# Patient Record
Sex: FEMALE | Race: WHITE | NOT HISPANIC OR LATINO
[De-identification: names, ages, dates, MRNs, and addresses within clinical notes are randomized per-mention and may not be internally consistent; named-entity substitution may affect disease eponyms.]

---

## 2017-05-03 ENCOUNTER — APPOINTMENT (OUTPATIENT)
Dept: GYNECOLOGIC ONCOLOGY | Facility: CLINIC | Age: 72
End: 2017-05-03

## 2017-05-03 VITALS
HEIGHT: 62 IN | DIASTOLIC BLOOD PRESSURE: 70 MMHG | BODY MASS INDEX: 33.86 KG/M2 | SYSTOLIC BLOOD PRESSURE: 118 MMHG | WEIGHT: 184 LBS

## 2018-05-03 ENCOUNTER — APPOINTMENT (OUTPATIENT)
Dept: GYNECOLOGIC ONCOLOGY | Facility: CLINIC | Age: 73
End: 2018-05-03
Payer: MEDICARE

## 2018-05-03 VITALS
BODY MASS INDEX: 34.04 KG/M2 | DIASTOLIC BLOOD PRESSURE: 80 MMHG | HEIGHT: 62 IN | WEIGHT: 185 LBS | SYSTOLIC BLOOD PRESSURE: 120 MMHG

## 2018-05-03 PROCEDURE — 99213 OFFICE O/P EST LOW 20 MIN: CPT

## 2018-08-18 ENCOUNTER — TRANSCRIPTION ENCOUNTER (OUTPATIENT)
Age: 73
End: 2018-08-18

## 2018-08-18 ENCOUNTER — EMERGENCY (EMERGENCY)
Facility: HOSPITAL | Age: 73
LOS: 1 days | Discharge: HOME | End: 2018-08-18
Attending: EMERGENCY MEDICINE
Payer: MEDICARE

## 2018-08-18 VITALS
TEMPERATURE: 98 F | WEIGHT: 184.97 LBS | DIASTOLIC BLOOD PRESSURE: 84 MMHG | RESPIRATION RATE: 18 BRPM | HEIGHT: 62.5 IN | HEART RATE: 66 BPM | OXYGEN SATURATION: 95 % | SYSTOLIC BLOOD PRESSURE: 130 MMHG

## 2018-08-18 DIAGNOSIS — Z98.890 OTHER SPECIFIED POSTPROCEDURAL STATES: Chronic | ICD-10-CM

## 2018-08-18 DIAGNOSIS — Z90.710 ACQUIRED ABSENCE OF BOTH CERVIX AND UTERUS: Chronic | ICD-10-CM

## 2018-08-18 LAB
ALBUMIN SERPL ELPH-MCNC: 4.2 G/DL — SIGNIFICANT CHANGE UP (ref 3.3–5)
ALP SERPL-CCNC: 108 U/L — SIGNIFICANT CHANGE UP (ref 40–120)
ALT FLD-CCNC: 18 U/L — SIGNIFICANT CHANGE UP (ref 10–45)
ANION GAP SERPL CALC-SCNC: 12 MMOL/L — SIGNIFICANT CHANGE UP (ref 5–17)
APPEARANCE UR: CLEAR — SIGNIFICANT CHANGE UP
APTT BLD: 31.5 SEC — SIGNIFICANT CHANGE UP (ref 27.5–37.4)
AST SERPL-CCNC: 21 U/L — SIGNIFICANT CHANGE UP (ref 10–40)
BASOPHILS # BLD AUTO: 0.1 K/UL — SIGNIFICANT CHANGE UP (ref 0–0.2)
BASOPHILS NFR BLD AUTO: 0.9 % — SIGNIFICANT CHANGE UP (ref 0–2)
BILIRUB SERPL-MCNC: 0.4 MG/DL — SIGNIFICANT CHANGE UP (ref 0.2–1.2)
BILIRUB UR-MCNC: NEGATIVE — SIGNIFICANT CHANGE UP
BUN SERPL-MCNC: 12 MG/DL — SIGNIFICANT CHANGE UP (ref 7–23)
CALCIUM SERPL-MCNC: 10 MG/DL — SIGNIFICANT CHANGE UP (ref 8.4–10.5)
CHLORIDE SERPL-SCNC: 102 MMOL/L — SIGNIFICANT CHANGE UP (ref 96–108)
CO2 SERPL-SCNC: 27 MMOL/L — SIGNIFICANT CHANGE UP (ref 22–31)
COLOR SPEC: SIGNIFICANT CHANGE UP
CREAT SERPL-MCNC: 0.69 MG/DL — SIGNIFICANT CHANGE UP (ref 0.5–1.3)
DIFF PNL FLD: NEGATIVE — SIGNIFICANT CHANGE UP
EOSINOPHIL # BLD AUTO: 0.3 K/UL — SIGNIFICANT CHANGE UP (ref 0–0.5)
EOSINOPHIL NFR BLD AUTO: 4.4 % — SIGNIFICANT CHANGE UP (ref 0–6)
GLUCOSE SERPL-MCNC: 144 MG/DL — HIGH (ref 70–99)
GLUCOSE UR QL: NEGATIVE — SIGNIFICANT CHANGE UP
HCT VFR BLD CALC: 43.9 % — SIGNIFICANT CHANGE UP (ref 34.5–45)
HGB BLD-MCNC: 14.4 G/DL — SIGNIFICANT CHANGE UP (ref 11.5–15.5)
INR BLD: 0.93 RATIO — SIGNIFICANT CHANGE UP (ref 0.88–1.16)
KETONES UR-MCNC: NEGATIVE — SIGNIFICANT CHANGE UP
LEUKOCYTE ESTERASE UR-ACNC: ABNORMAL
LYMPHOCYTES # BLD AUTO: 1.7 K/UL — SIGNIFICANT CHANGE UP (ref 1–3.3)
LYMPHOCYTES # BLD AUTO: 26.4 % — SIGNIFICANT CHANGE UP (ref 13–44)
MCHC RBC-ENTMCNC: 31.9 PG — SIGNIFICANT CHANGE UP (ref 27–34)
MCHC RBC-ENTMCNC: 32.9 GM/DL — SIGNIFICANT CHANGE UP (ref 32–36)
MCV RBC AUTO: 97.2 FL — SIGNIFICANT CHANGE UP (ref 80–100)
MONOCYTES # BLD AUTO: 0.6 K/UL — SIGNIFICANT CHANGE UP (ref 0–0.9)
MONOCYTES NFR BLD AUTO: 8.7 % — SIGNIFICANT CHANGE UP (ref 2–14)
NEUTROPHILS # BLD AUTO: 3.8 K/UL — SIGNIFICANT CHANGE UP (ref 1.8–7.4)
NEUTROPHILS NFR BLD AUTO: 59.5 % — SIGNIFICANT CHANGE UP (ref 43–77)
NITRITE UR-MCNC: NEGATIVE — SIGNIFICANT CHANGE UP
PH UR: 7.5 — SIGNIFICANT CHANGE UP (ref 5–8)
PLATELET # BLD AUTO: 232 K/UL — SIGNIFICANT CHANGE UP (ref 150–400)
POTASSIUM SERPL-MCNC: 4 MMOL/L — SIGNIFICANT CHANGE UP (ref 3.5–5.3)
POTASSIUM SERPL-SCNC: 4 MMOL/L — SIGNIFICANT CHANGE UP (ref 3.5–5.3)
PROT SERPL-MCNC: 7.8 G/DL — SIGNIFICANT CHANGE UP (ref 6–8.3)
PROT UR-MCNC: NEGATIVE — SIGNIFICANT CHANGE UP
PROTHROM AB SERPL-ACNC: 10.1 SEC — SIGNIFICANT CHANGE UP (ref 9.8–12.7)
RBC # BLD: 4.52 M/UL — SIGNIFICANT CHANGE UP (ref 3.8–5.2)
RBC # FLD: 13.1 % — SIGNIFICANT CHANGE UP (ref 10.3–14.5)
SODIUM SERPL-SCNC: 141 MMOL/L — SIGNIFICANT CHANGE UP (ref 135–145)
SP GR SPEC: 1.01 — LOW (ref 1.01–1.02)
UROBILINOGEN FLD QL: NEGATIVE — SIGNIFICANT CHANGE UP
WBC # BLD: 6.4 K/UL — SIGNIFICANT CHANGE UP (ref 3.8–10.5)
WBC # FLD AUTO: 6.4 K/UL — SIGNIFICANT CHANGE UP (ref 3.8–10.5)

## 2018-08-18 PROCEDURE — 73610 X-RAY EXAM OF ANKLE: CPT

## 2018-08-18 PROCEDURE — 85610 PROTHROMBIN TIME: CPT

## 2018-08-18 PROCEDURE — 99284 EMERGENCY DEPT VISIT MOD MDM: CPT | Mod: GC

## 2018-08-18 PROCEDURE — 80053 COMPREHEN METABOLIC PANEL: CPT

## 2018-08-18 PROCEDURE — 99284 EMERGENCY DEPT VISIT MOD MDM: CPT

## 2018-08-18 PROCEDURE — 85027 COMPLETE CBC AUTOMATED: CPT

## 2018-08-18 PROCEDURE — 93971 EXTREMITY STUDY: CPT | Mod: 26

## 2018-08-18 PROCEDURE — 85730 THROMBOPLASTIN TIME PARTIAL: CPT

## 2018-08-18 PROCEDURE — 73610 X-RAY EXAM OF ANKLE: CPT | Mod: 26,RT

## 2018-08-18 PROCEDURE — 87086 URINE CULTURE/COLONY COUNT: CPT

## 2018-08-18 PROCEDURE — 81001 URINALYSIS AUTO W/SCOPE: CPT

## 2018-08-18 PROCEDURE — 93971 EXTREMITY STUDY: CPT

## 2018-08-18 NOTE — ED PROVIDER NOTE - PHYSICAL EXAMINATION
CONSTITUTIONAL: elderly female in no acute respiratory distress  HEAD: Normocephalic; atraumatic  EYES: PERRLA, EOMI, no nystagmus  ENMT: External appears normal; normal oropharynx  NECK: Supple; non-tender; no cervical lymphadenopathy  CARD: Normal Sl, S2; no audible murmurs, rubs, or gallops  RESP: Breathing comfortably on RA, normal wob, lungs ctab/l, no audible wheezes/rales/rhonchi  ABD: Soft, non-distended; non-tender; no rebound or guarding  EXT: Right lower extremity with swelling around the ankle, erythema in the area, tender to palpation, right lateral calf tender to palpation, no ecchymosis, old discoloring from previous cellulitis, distal pulses intact, no increased warmth   SKIN: Warm, dry, no rashes  NEURO: aaox3, equal strength and sensation b/l UE and LE, normal gait

## 2018-08-18 NOTE — ED ADULT NURSE NOTE - NSIMPLEMENTINTERV_GEN_ALL_ED
Implemented All Universal Safety Interventions:  Challenge to call system. Call bell, personal items and telephone within reach. Instruct patient to call for assistance. Room bathroom lighting operational. Non-slip footwear when patient is off stretcher. Physically safe environment: no spills, clutter or unnecessary equipment. Stretcher in lowest position, wheels locked, appropriate side rails in place.

## 2018-08-18 NOTE — ED PROVIDER NOTE - PLAN OF CARE
You were seen for swelling and redness in your ankle. Your ultrasound of the leg showed no clots and your xray showed no broken bones. Please return if you have increased pain in the leg, if the redness gets worse, if you develop fevers, or if you have chest pain or shortness of breath. Please take the antibiotic as prescribed.

## 2018-08-18 NOTE — ED PROVIDER NOTE - NS ED ROS FT
General: denies fever, chills  HENT: denies nasal congestion, sore throat, rhinorrhea, ear pain  Eyes: denies visual changes, blurred vision  CV: denies chest pain, palpitations  Resp: denies difficulty breathing, cough  Abdominal: denies nausea, vomiting, diarrhea, abdominal pain  MSK: + redness pain and swelling to right lower extremity   Neuro: denies headaches, numbness, tingling, dizziness, lightheadedness.  Skin: + itchy rash to upper back

## 2018-08-18 NOTE — ED PROVIDER NOTE - CARE PLAN
Assessment and plan of treatment:	You were seen for swelling and redness in your ankle. Your ultrasound of the leg showed no clots and your xray showed no broken bones. Please return if you have increased pain in the leg, if the redness gets worse, if you develop fevers, or if you have chest pain or shortness of breath. Please take the antibiotic as prescribed. Principal Discharge DX:	Cellulitis of right ankle  Assessment and plan of treatment:	You were seen for swelling and redness in your ankle. Your ultrasound of the leg showed no clots and your xray showed no broken bones. Please return if you have increased pain in the leg, if the redness gets worse, if you develop fevers, or if you have chest pain or shortness of breath. Please take the antibiotic as prescribed.

## 2018-08-18 NOTE — ED ADULT TRIAGE NOTE - BSA (M2)
----- Message from Cesia Pop MD sent at 3/6/2018  9:39 PM CST -----  Us looks good due date will be 8/27/17   1.86

## 2018-08-18 NOTE — ED PROVIDER NOTE - MEDICAL DECISION MAKING DETAILS
73F w/ history of cancer p/w right lower extremity swelling, swelling and erythema with tenderness to palpation in the right lateral calf, r/o DVT. r/o occult fracture w/ xray. Unlikely cellulitis given presentation

## 2018-08-18 NOTE — ED PROVIDER NOTE - OBJECTIVE STATEMENT
73F w/ history of uterine and breast cancer p/w right lower extremity swelling. States she had some swelling and redness in the right ankle and distal calf area for two days which became a lot worse today. Reports pain with palpation but no pain with ambulation or weight bearing. Reports an episode of cellulitis in the area. Denies fevers, chills, nausea, vomiting, diarrhea, SOB, chest pain, dizziness, lightheadedness. No personal history of clots but states her mother had a PE at 72. Denies trauma to the area, recent travel, prolonged immobilization.

## 2018-08-18 NOTE — ED PROVIDER NOTE - ATTENDING CONTRIBUTION TO CARE
74 yo female with atraumatic R ankle edema/erythema.  No clear infectious focus.  Will obtain plain films, r/o occult fx, U/S, r/o DVT, if normal then d/c with PO antibiotics and outpatient f/u.

## 2018-08-18 NOTE — ED ADULT NURSE NOTE - OBJECTIVE STATEMENT
1956 pt 73yf aox4 c/o rt ft swelling since thurs took only aspirin yesterday for pain states on movt 2/10, pending elisha/celia

## 2018-08-19 VITALS
SYSTOLIC BLOOD PRESSURE: 122 MMHG | HEART RATE: 67 BPM | OXYGEN SATURATION: 95 % | TEMPERATURE: 98 F | DIASTOLIC BLOOD PRESSURE: 76 MMHG | RESPIRATION RATE: 18 BRPM

## 2018-08-19 LAB
CULTURE RESULTS: NO GROWTH — SIGNIFICANT CHANGE UP
SPECIMEN SOURCE: SIGNIFICANT CHANGE UP

## 2018-08-19 RX ORDER — CEPHALEXIN 500 MG
1 CAPSULE ORAL
Qty: 20 | Refills: 0
Start: 2018-08-19 | End: 2018-08-23

## 2019-01-24 ENCOUNTER — APPOINTMENT (OUTPATIENT)
Dept: GYNECOLOGIC ONCOLOGY | Facility: CLINIC | Age: 74
End: 2019-01-24
Payer: MEDICARE

## 2019-01-24 VITALS
WEIGHT: 178 LBS | BODY MASS INDEX: 32.76 KG/M2 | HEIGHT: 62 IN | DIASTOLIC BLOOD PRESSURE: 82 MMHG | SYSTOLIC BLOOD PRESSURE: 128 MMHG

## 2019-01-24 PROBLEM — R32 UNSPECIFIED URINARY INCONTINENCE: Chronic | Status: ACTIVE | Noted: 2018-08-18

## 2019-01-24 PROBLEM — C50.919 MALIGNANT NEOPLASM OF UNSPECIFIED SITE OF UNSPECIFIED FEMALE BREAST: Chronic | Status: ACTIVE | Noted: 2018-08-18

## 2019-01-24 PROBLEM — C55 MALIGNANT NEOPLASM OF UTERUS, PART UNSPECIFIED: Chronic | Status: ACTIVE | Noted: 2018-08-18

## 2019-01-24 PROCEDURE — 57100 BIOPSY VAGINAL MUCOSA SIMPLE: CPT

## 2019-01-24 PROCEDURE — 99213 OFFICE O/P EST LOW 20 MIN: CPT | Mod: 25

## 2019-01-29 LAB — CORE LAB BIOPSY: NORMAL

## 2019-02-07 ENCOUNTER — TRANSCRIPTION ENCOUNTER (OUTPATIENT)
Age: 74
End: 2019-02-07

## 2019-07-25 ENCOUNTER — APPOINTMENT (OUTPATIENT)
Dept: GYNECOLOGIC ONCOLOGY | Facility: CLINIC | Age: 74
End: 2019-07-25
Payer: MEDICARE

## 2019-07-25 VITALS
HEIGHT: 62 IN | BODY MASS INDEX: 31.83 KG/M2 | SYSTOLIC BLOOD PRESSURE: 118 MMHG | HEART RATE: 71 BPM | WEIGHT: 173 LBS | DIASTOLIC BLOOD PRESSURE: 73 MMHG

## 2019-07-25 PROCEDURE — 99213 OFFICE O/P EST LOW 20 MIN: CPT

## 2019-07-25 NOTE — PROCEDURE
[Vaginal Biopsy] : a vaginal biopsy [Other: ___] : [unfilled] [Patient] : the patient [Verbal Consent] : verbal consent was obtained prior to the procedure and is detailed in the patient's record [Site Verification] : site verification performed. [Time Out] : Time Out Procedure:The following was confirmed prior to the procedure: Correct patient identity, correct site, agreement on the procedure to be done and correct patient position. [Joyce's] : Joyce's solution [Silver Nitrate] : silver nitrate [Yes] : the specimen was sent to pathology [No Complications] : none [Tolerated Well] : the patient tolerated the procedure well [Post procedure instructions and information given] : post procedure instructions and information given and reviewed with patient. [2] : 2 [FreeTextEntry1] : a few fragments of loosely attached tissue were biopsied ; then hemostatic agents applied; good hemostasis

## 2019-08-07 NOTE — LETTER BODY
[Dear  ___] : Dear  [unfilled], [I recently saw our patient [unfilled] for a follow-up visit.] : I recently saw our patient, [unfilled] for a follow-up visit. [Attached please find my note.] : Attached please find my note. [FreeTextEntry2] : She is clinically without evidence of disease at this time. \par I advised her to see me in 6 months for continued surveillance, and to see you annually in the interim. Thank you again for referring this sole patient.

## 2019-08-07 NOTE — PHYSICAL EXAM
[Absent] : Adnexa(ae): Absent [Normal] : Recto-Vaginal Exam: Normal [de-identified] : well-healed vertical hysterectomy scar [de-identified] : mild stenosis at apex moderate atrophy; disrupted adhesions at apex are actively bleeding with speculum insertion but no lesions (patient used dilator right before visit) [de-identified] : no mass or tenderness

## 2019-08-07 NOTE — HISTORY OF PRESENT ILLNESS
[FreeTextEntry1] : Ms. Neely has recurrent endometrial cancer. She underwent ELIDA/BSO in 2004 for FIGO grade 1 endometrioid adenocarcinoma, myoinvasive <50% (4/11mm) with negative cytology and no staging performed. She did not have any adjuvant therapy and did not follow up consistently with a gyn oncologist until February 2013 when she was diagnosed with a small vaginal apex recurrence by Dr. Cristian Alvarez. PET/CT revealed no evidence of metastatic disease, and she was therefore treated with radiation therapy.\par \par She was treated by Dr. Fry with EBRT 4500 cGy followed by brachytherapy completed 5/13/13. She tolerated treatment very well without significant toxicity. \par \par She is overall feeling very well and denies cancer-associated signs and symptoms. She is having regular BMs and denies urinary complaints other than incontinence in the mornings after taking her diuretic.  She saw a urologist and is on pharmacologic treatment. \par She is using her prescribed vaginal dilator therapy, although admits she has only been using it once a month, and actually didn’t use it for several months until a few days ago when she used it in advance of her appt today, and saw bleeding afterwards which stopped that day.  \par She otherwise denies VB or abnormal discharge, except for slight blood on the dilator after use.\par She had some chronic BRBPR evaluated by her GI, it is attributed to constipation/hemorrhoids and has improved with use of stool softeners.\par \par Incidentally, she also has a history of breast cancer which was diagnosed in 2008 and treated with left breast lumpectomy, radiation and chemotherapy. She was followed by [place]. Erickson Casanova at [facility], but currently has her breast exams with Dr. Gusman.\par She reports that her markers were recently elevated (we will call for report) and therefore Dr. Gusman ordered a PET/CT which was performed at [facility] this morning. \par \par PAPs: \par 1/2019 vaginal biopsy benign\par 10/13/16 PAP negative\par 3/10/16: PAP negative; vaginal biopsy- granulation tissue\par 10/2015: she reports that Dr. Fry did a PAP and it was normal (we have no report)\par 2/2015 negative\par 10/22/14 insufficient due to scant cellularity; \par 1/15/14 negative; vaginal biopsy 5/2014 benign\par \par Mammo/sono: 2018 negative per pt, breast health per Dr. Gusman; has appt for October\par Colonoscopy: 2014-15 negative per pt ; Dr. BRYSON Pires; diverticulosis\par \par GI: Dr. BRYSON Pires\par PCP: Dr. Cintron\par Med onc: Dr. Eleuterio Gusman\par Endocrinologist (thyroid nodule): Dr. Funmi Boles\par Urologist: Dr. Brown\par Referring GYN: Dr. Cristian Alvarez

## 2019-10-31 NOTE — ED ADULT NURSE NOTE - CAS TRG GENERAL AIRWAY, MLM
Patent O-L Flap Text: The defect edges were debeveled with a #15 scalpel blade.  Given the location of the defect, shape of the defect and the proximity to free margins an O-L flap was deemed most appropriate.  Using a sterile surgical marker, an appropriate advancement flap was drawn incorporating the defect and placing the expected incisions within the relaxed skin tension lines where possible.    The area thus outlined was incised deep to adipose tissue with a #15 scalpel blade.  The skin margins were undermined to an appropriate distance in all directions utilizing iris scissors.

## 2020-01-20 ENCOUNTER — TRANSCRIPTION ENCOUNTER (OUTPATIENT)
Age: 75
End: 2020-01-20

## 2020-02-06 ENCOUNTER — APPOINTMENT (OUTPATIENT)
Dept: GYNECOLOGIC ONCOLOGY | Facility: CLINIC | Age: 75
End: 2020-02-06
Payer: MEDICARE

## 2020-02-06 VITALS
HEART RATE: 77 BPM | BODY MASS INDEX: 30.36 KG/M2 | WEIGHT: 165 LBS | SYSTOLIC BLOOD PRESSURE: 144 MMHG | DIASTOLIC BLOOD PRESSURE: 84 MMHG | HEIGHT: 62 IN

## 2020-02-06 PROCEDURE — 99213 OFFICE O/P EST LOW 20 MIN: CPT

## 2020-08-13 ENCOUNTER — APPOINTMENT (OUTPATIENT)
Dept: GYNECOLOGIC ONCOLOGY | Facility: CLINIC | Age: 75
End: 2020-08-13
Payer: MEDICARE

## 2020-08-13 VITALS
SYSTOLIC BLOOD PRESSURE: 124 MMHG | WEIGHT: 164 LBS | HEIGHT: 62 IN | BODY MASS INDEX: 30.18 KG/M2 | HEART RATE: 89 BPM | DIASTOLIC BLOOD PRESSURE: 75 MMHG

## 2020-08-13 PROCEDURE — 99024 POSTOP FOLLOW-UP VISIT: CPT

## 2021-11-18 DIAGNOSIS — Z01.818 ENCOUNTER FOR OTHER PREPROCEDURAL EXAMINATION: ICD-10-CM

## 2021-11-29 ENCOUNTER — TRANSCRIPTION ENCOUNTER (OUTPATIENT)
Age: 76
End: 2021-11-29

## 2021-12-02 ENCOUNTER — APPOINTMENT (OUTPATIENT)
Dept: PULMONOLOGY | Facility: CLINIC | Age: 76
End: 2021-12-02
Payer: MEDICARE

## 2021-12-02 VITALS
RESPIRATION RATE: 16 BRPM | HEIGHT: 60 IN | HEART RATE: 60 BPM | SYSTOLIC BLOOD PRESSURE: 110 MMHG | BODY MASS INDEX: 31.02 KG/M2 | OXYGEN SATURATION: 87 % | TEMPERATURE: 97.3 F | WEIGHT: 158 LBS | DIASTOLIC BLOOD PRESSURE: 60 MMHG

## 2021-12-02 DIAGNOSIS — Z85.3 PERSONAL HISTORY OF MALIGNANT NEOPLASM OF BREAST: ICD-10-CM

## 2021-12-02 DIAGNOSIS — Z87.09 PERSONAL HISTORY OF OTHER DISEASES OF THE RESPIRATORY SYSTEM: ICD-10-CM

## 2021-12-02 PROCEDURE — 99204 OFFICE O/P NEW MOD 45 MIN: CPT | Mod: 25

## 2021-12-02 PROCEDURE — ZZZZZ: CPT

## 2021-12-02 PROCEDURE — 71046 X-RAY EXAM CHEST 2 VIEWS: CPT

## 2021-12-02 PROCEDURE — 94729 DIFFUSING CAPACITY: CPT

## 2021-12-02 PROCEDURE — 94618 PULMONARY STRESS TESTING: CPT

## 2021-12-02 PROCEDURE — 94727 GAS DIL/WSHOT DETER LNG VOL: CPT

## 2021-12-02 PROCEDURE — 94010 BREATHING CAPACITY TEST: CPT

## 2021-12-02 PROCEDURE — 95012 NITRIC OXIDE EXP GAS DETER: CPT

## 2021-12-02 RX ORDER — VERAPAMIL HYDROCHLORIDE 240 MG/1
240 TABLET ORAL
Refills: 0 | Status: ACTIVE | COMMUNITY

## 2021-12-02 RX ORDER — METOPROLOL TARTRATE 75 MG/1
TABLET, FILM COATED ORAL
Refills: 0 | Status: ACTIVE | COMMUNITY

## 2021-12-02 RX ORDER — RIVAROXABAN 15 MG/1
15 TABLET, FILM COATED ORAL
Refills: 0 | Status: ACTIVE | COMMUNITY

## 2021-12-02 NOTE — ASSESSMENT
[FreeTextEntry1] : Ms. YEUNG is a 76 year old female with a history of left sided breast cancer, former 40+ pack smoker, s/p lumpectomy and radiation theray (2008), uterine cancer, s/p ELIDA, diverticulitis, kyphoscoliosis, hidradenitis supertavia, thyroid nodules, 40 pack year smoker, bronchitis, vaginal cancer (2013) s/p radiation, s/p SVT, blood clots in both lungs (PE), arthritis, and balance issues  presenting to the office today for initial pulmonary evaluation. Her chief complaint is sob, restrictive dysfunction, COPD, pulmonary emboli, allergies, gerd \par \par Her shortness of breath is multifactorial due to:\par -poor mechanics of breathing \par -out of shape / overweight\par -pulmonary disease\par    -Full PFTs to follow\par -cardiac disease (Dr. Persaud ([facility]par \par Problem 1: restrictive dysfunction (kyphoscoliosis)\par -complete CBC \par -complete iron studies \par \par Problem 2: History of pulmonary emboli \par -Complete yearly ECHOcardiogram \par -continue Xarelto 15 mg \par Blood clots are noted w/in your lungs diagnosed by either abnormal CTPA or ventilation perfusion scan. You will need a hypercoagulable work up done by a hematologist to attempt to identify the etiology of this problem. Anticoagulation will be needed for at least 6 months- drugs included are Coumadin, lovenox, arixtra, or another agent. Repeat CTPA or VQ scan will be needed in approximately 6-8 weeks. An echocardiogram may be needed to assess for right ventricular function and pulmonary hypertension. The importance of hydration, ambulation, and regular/consistent diet are extremely important. \par \par \par Problem 3: COPD \par -add Anoro 1 inhalation QD \par COPD is a progressive disease and although it can’t be cured , appropriate management can slow its progression, reduce frequency and severity of exacerbations, and improve symptoms and the patient quality of life. Hospitalizations are the greatest contributor to the total COPD costs and account for up to 87% of total COPD related costs. Exacerbations are the main cause of admissions and subsequently account for the 40-75% of COPD costs. Inhaled maintenance therapy reduces the incidence of exacerbations in patients with stable COPD. Incorrect inhaler use and nonadherence are major obstacles to achieving COPD treatment goals. Many COPD patients have challenges (impaired inhalation, limited dexterity, reduced cognition: that limit their ability to correctly use their COPD treatment devices resulting in reduced symptom control. Of most importance is smoking cessation and early intervention with respiratory illnesses and contemplation for pulmonary rehab to enhance quality of life. \par Inhaler technique reviewed as well as oral hygiene techniques reviewed with patient. Avoidance of cold air, extremes of temperature, rescue inhaler should be used before exercise. Order of medication reviewed with patient. Recommended use of a cool mist humidifier in the bedroom. \par \par \par Problem 4: ILDz\par -Complete high resolution CT scan of the chest \par Etiology will depend on the causative agent possibilities include: idiopathic pulmonary fibrosis (UIP), NSIP (nonspecific interstitial pneumonia) , respiratory bronchiolitis in someone who is a smoker, drug induced lung disease, hypersensitivity pneumonitis, BOOP, sarcoidosis, chronic congestive heart failure,  rheumatologic disorder induced interstitial lung disease. Optimal diagnosing will include rheumatological panel which would include ESR, ASHELY, ANCA, ARNP, CCP, rheumatoid factor, hypersensitivity panel, JOE1, scleroderma antibodies, sjogren's syndrome antibodies; biopsy will be necessary to definitively determine the etiology unless the CT scan findings are specific for UIP. Therapy will be based on diagnostics. \par \par \par Problem 5:  Allergies/PND \par -add Olopatadine 0.6% 1 sniff BID \par Environmental measures for allergies were encouraged including mattress and pillow covers, air purifier, and environmental controls. \par \par Problem 6: GERD\par -Add Pepcid 40mg QHS \par -Rule of 2s: avoid eating too much, eating too late, eating too spicy, eating two hours before bed.\par -Things to avoid including overeating, spicy foods, tight clothing, eating within three hours of bed, this list is not all inclusive. \par -For treatment of reflux, possible options discussed including diet control, H2 blockers, PPIs, as well as coating motility agents discussed as treatment options. Timing of meals and proximity of last meal to sleep were discussed. If symptoms persist, a formal gastrointestinal evaluation is needed. \par \par Problem 7: Chronic Respiratory failure \par -Currently on Oxygen \par \par Problem 8: Balance issues \par -Complete balance therapy \par \par problem : cardiac disease\par -recommended to continue to follow up with Cardiologist \par \par problem : poor breathing mechanics\par -Proper breathing techniques were reviewed with an emphasis of exhalation. Patient instructed to breath in for 1 second and out for four seconds. Patient was encouraged to not talk while walking. \par \par problem : out of shape / overweight\par -Weight loss, exercise, and diet control were discussed and are highly encouraged. Treatment options were given such as, aqua therapy, and contacting a nutritionist. Recommended to use the elliptical, stationary bike, less use of treadmill. Mindful eating was explained to the patient Obesity is associated with worsening asthma, shortness of breath, and potential for cardiac disease, diabetes, and other underlying medical conditions\par \par problem : health maintenance \par -recommended yearly flu shot after October 15\par -recommended strep pneumonia vaccines: Prevnar-13 vaccine, followed by Pneumo vaccine 23 one year following after 65\par -recommended early intervention for Upper Respiratory Infections (URIs)\par -recommended regular osteoporosis evaluations\par -recommended early dermatological evaluations\par -recommended after the age of 50 to the age of 70, colonoscopy every 5 years\par \par F/U in 6-8 weeks.\par She is encouraged to call with any changes, concerns, or questions

## 2021-12-02 NOTE — PROCEDURE
[FreeTextEntry1] : Full PFT revealed moderate obstructive dysfunction,  normal flows, with a FEV1 of 1.03L,which is 57% of predicted, mild decreased  normal lung volumes, and a low normal diffusion of 13.3, which is 73% of predicted with a normal flow volume loop \par \par FENO was   22    ; a normal value being less than 25\par Fractional exhaled nitric oxide (FENO) is regarded as a simple, noninvasive method for assessing eosinophilic airway inflammation. Produced by a variety of cells within the lung, nitric oxide (NO) concentrations are generally low in healthy individuals. However, high concentrations of NO appear to be involved in nonspecific host defense mechanisms and chronic inflammatory diseases such as asthma. The American Thoracic Society (ATS) therefore has recommended using FENO to aid in the diagnosis and monitoring of eosinophilic airway inflammation and asthma, and for identifying steroid responsive individuals whose chronic respiratory symptoms may be caused by airway inflammation. \par \par CXR reveals a normal sized heart; evidence of increased interstitial markings in the lungs right over the left \par \par 6 minute walk test reveals a low saturation of 96% with slight evidence of dyspnea or fatigue; walked 260.8  meters \par \par CT scan (8/3/2021) showed a previously described bilateral pulmonary embolisms are no longer visualized. No acute cardiopulmonary abnormality. Lungs: lobar consolidation: negative, Pleural effusion: Negative. Pneumothorax: Negative. Other: Subsegmental atelectasis along with the lung bases \par

## 2021-12-02 NOTE — PHYSICAL EXAM
[No Acute Distress] : no acute distress [Normal Oropharynx] : normal oropharynx [III] : Mallampati Class: III [Normal Appearance] : normal appearance [No Neck Mass] : no neck mass [Normal Rate/Rhythm] : normal rate/rhythm [Normal S1, S2] : normal s1, s2 [No Murmurs] : no murmurs [No Resp Distress] : no resp distress [Clear to Auscultation Bilaterally] : clear to auscultation bilaterally [Kyphosis] : kyphosis [Benign] : benign [Normal Gait] : normal gait [No Clubbing] : no clubbing [No Cyanosis] : no cyanosis [No Edema] : no edema [FROM] : FROM [Normal Color/ Pigmentation] : normal color/ pigmentation [No Focal Deficits] : no focal deficits [Oriented x3] : oriented x3 [Normal Affect] : normal affect [TextBox_68] : I:E ratio 1:3; inspiratory crackles about 1/2 way up  [TextBox_105] : 1+ LE edema

## 2021-12-02 NOTE — HISTORY OF PRESENT ILLNESS
[TextBox_4] : Ms. YEUNG is a 76 year old female with a history of left sided breast cancer, s/p lumpectomy and radiation theray (2008), uterine cancer, s/p ELIDA, diverticulitis, kyphoscoliosis, hidradenitis supertavia, thyroid nodules, 40 pack year smoker, bronchitis, vaginal cancer (2013) s/p radiation, blood clots in both lungs (PE), history of SVT, arthritis  presenting to the office today for initial pulmonary evaluation. Her chief complaint is sob \par \par -she notes getting hospitalized july 2021 \par -she notes getting an angiogram done with ECHO\par -she notes having blood clots in the lung \par -she notes being given zeralto  15 mg \par -she notes going back 10 days later for CT scan and the clots were gone \par -she notes being on blood thinners \par -she notes breathing okay without oxygen for a couple hours and stays above 90% \par -she notes being on oxygen since 8/2021 \par -she notes being some sob in the past when she would walk \par -she notes arthritis \par -she denies orthopnea \par -she notes doing to [facility] 7/2021 \par -she notes her sinuses are okay \par -she notes doing to dr. morgan \par -pt states normal bowel movements \par -she notes being on metoprolol \par -she was getting palpitations but not too much recently \par -she notes having swelling in her legs \par -she notes having a lot of reflux but hasn't had it much recently \par -she notes her sleep patterns terrible because of nocturia \par -she denies snoring \par -she denies any headaches, nausea, vomiting, fever, chills, sweats, chest pain, chest pressure, diarrhea, constipation, dysphagia, dizziness, leg swelling, leg pain, itchy eyes, itchy ears, heartburn, reflux, sour taste in the mouth, myalgias or arthralgias.

## 2021-12-02 NOTE — ADDENDUM
[FreeTextEntry1] : Documented by Ayleen Leiva acting as a scribe for Dr. Lucas Prado on 12/02/2021 .\par \par All medical record entries made by the Scribe were at my, Dr. Lucas Prado's, direction and personally dictated by me on. I have reviewed the chart and agree that the record accurately reflects my personal performance of the history, physical exam, assessment and plan. I have also personally directed, reviewed, and agree with the discharge instructions.

## 2021-12-02 NOTE — REASON FOR VISIT
[Initial] : an initial visit [TextBox_44] : sob, restrictive dysfunction, COPD, pulmonary emboli, allergies, gerd

## 2021-12-08 ENCOUNTER — OUTPATIENT (OUTPATIENT)
Dept: OUTPATIENT SERVICES | Facility: HOSPITAL | Age: 76
LOS: 1 days | End: 2021-12-08
Payer: MEDICARE

## 2021-12-08 ENCOUNTER — APPOINTMENT (OUTPATIENT)
Dept: RADIOLOGY | Facility: IMAGING CENTER | Age: 76
End: 2021-12-08
Payer: MEDICARE

## 2021-12-08 ENCOUNTER — APPOINTMENT (OUTPATIENT)
Dept: CT IMAGING | Facility: IMAGING CENTER | Age: 76
End: 2021-12-08
Payer: MEDICARE

## 2021-12-08 DIAGNOSIS — Z98.890 OTHER SPECIFIED POSTPROCEDURAL STATES: Chronic | ICD-10-CM

## 2021-12-08 DIAGNOSIS — Z90.710 ACQUIRED ABSENCE OF BOTH CERVIX AND UTERUS: Chronic | ICD-10-CM

## 2021-12-08 DIAGNOSIS — J96.11 CHRONIC RESPIRATORY FAILURE WITH HYPOXIA: ICD-10-CM

## 2021-12-08 PROCEDURE — 71046 X-RAY EXAM CHEST 2 VIEWS: CPT | Mod: 26

## 2021-12-08 PROCEDURE — G1004: CPT

## 2021-12-08 PROCEDURE — 71250 CT THORAX DX C-: CPT | Mod: MG

## 2021-12-08 PROCEDURE — 71250 CT THORAX DX C-: CPT | Mod: 26,MG

## 2021-12-08 PROCEDURE — 71046 X-RAY EXAM CHEST 2 VIEWS: CPT

## 2021-12-27 ENCOUNTER — NON-APPOINTMENT (OUTPATIENT)
Age: 76
End: 2021-12-27

## 2022-01-28 ENCOUNTER — APPOINTMENT (OUTPATIENT)
Dept: PULMONOLOGY | Facility: CLINIC | Age: 77
End: 2022-01-28
Payer: MEDICARE

## 2022-01-28 ENCOUNTER — NON-APPOINTMENT (OUTPATIENT)
Age: 77
End: 2022-01-28

## 2022-01-28 VITALS
SYSTOLIC BLOOD PRESSURE: 116 MMHG | HEIGHT: 60 IN | OXYGEN SATURATION: 95 % | HEART RATE: 64 BPM | TEMPERATURE: 97.8 F | BODY MASS INDEX: 31.41 KG/M2 | RESPIRATION RATE: 16 BRPM | WEIGHT: 160 LBS | DIASTOLIC BLOOD PRESSURE: 68 MMHG

## 2022-01-28 DIAGNOSIS — Z01.811 ENCOUNTER FOR PREPROCEDURAL RESPIRATORY EXAMINATION: ICD-10-CM

## 2022-01-28 PROCEDURE — 95012 NITRIC OXIDE EXP GAS DETER: CPT

## 2022-01-28 PROCEDURE — 94010 BREATHING CAPACITY TEST: CPT

## 2022-01-28 PROCEDURE — 99214 OFFICE O/P EST MOD 30 MIN: CPT | Mod: 25

## 2022-01-28 NOTE — HISTORY OF PRESENT ILLNESS
[TextBox_4] : Ms. YEUNG is a 76 year old female with a history of left sided breast cancer, s/p lumpectomy and radiation theray (2008), uterine cancer, s/p ELIDA, diverticulitis, kyphoscoliosis, hidradenitis supertavia, thyroid nodules, 40 pack year smoker, bronchitis, vaginal cancer (2013) s/p radiation, blood clots in both lungs (PE), history of SVT, arthritis  presenting to the office today for a follow-up pulmonary evaluation. 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

## 2022-01-28 NOTE — ASSESSMENT
[FreeTextEntry1] : Ms. YEUNG is a 76 year old female with a history of left sided breast cancer, former 40+ pack smoker, s/p lumpectomy and radiation theray (2008), uterine cancer, s/p ELIDA, diverticulitis, kyphoscoliosis, hidradenitis supertavia, thyroid nodules, 40 pack year smoker, bronchitis, vaginal cancer (2013) s/p radiation, s/p SVT, blood clots in both lungs (PE), arthritis, and balance issues  presenting to the office today for a follow-up pulmonary evaluation. Her chief complaint is sob, restrictive dysfunction, COPD, pulmonary emboli, allergies, gerd - mild ILDz\par \par ******************************************************Pre-op Clearance ****************************************************\par \par \par Her shortness of breath is multifactorial due to:\par -poor mechanics of breathing \par -out of shape / overweight\par -pulmonary disease\par    -Full PFTs to follow\par -cardiac disease (Dr. Persaud ([facility]))\par \par Problem 1: restrictive dysfunction (kyphoscoliosis)\par -complete CBC \par -complete iron studies \par \par Problem 2: History of pulmonary emboli \par -Complete yearly ECHOcardiogram ([place])\par -continue Xarelto 15 mg \par Blood clots are noted w/in your lungs diagnosed by either abnormal CTPA or ventilation perfusion scan. You will need a hypercoagulable work up done by a hematologist to attempt to identify the etiology of this problem. Anticoagulation will be needed for at least 6 months- drugs included are Coumadin, lovenox, arixtra, or another agent. Repeat CTPA or VQ scan will be needed in approximately 6-8 weeks. An echocardiogram may be needed to assess for right ventricular function and pulmonary hypertension. The importance of hydration, ambulation, and regular/consistent diet are extremely important. \par \par \par Problem 3: COPD \par -continue Anoro 1 inhalation QD \par COPD is a progressive disease and although it can’t be cured , appropriate management can slow its progression, reduce frequency and severity of exacerbations, and improve symptoms and the patient quality of life. Hospitalizations are the greatest contributor to the total COPD costs and account for up to 87% of total COPD related costs. Exacerbations are the main cause of admissions and subsequently account for the 40-75% of COPD costs. Inhaled maintenance therapy reduces the incidence of exacerbations in patients with stable COPD. Incorrect inhaler use and nonadherence are major obstacles to achieving COPD treatment goals. Many COPD patients have challenges (impaired inhalation, limited dexterity, reduced cognition: that limit their ability to correctly use their COPD treatment devices resulting in reduced symptom control. Of most importance is smoking cessation and early intervention with respiratory illnesses and contemplation for pulmonary rehab to enhance quality of life. \par Inhaler technique reviewed as well as oral hygiene techniques reviewed with patient. Avoidance of cold air, extremes of temperature, rescue inhaler should be used before exercise. Order of medication reviewed with patient. Recommended use of a cool mist humidifier in the bedroom. \par \par Problem 3A: Pulmonary Pre-Op Clearance for Dental Surgery\par -at this point in time there are no absolute pulmonary contraindications to go forward with the planned procedure \par -at the time of surgery s/he should have optimal pain control, incentive spirometry, early ambulation, DVT and GI prophylaxis. \par \par \par Problem 4: ILDz\par -s/p high resolution CT scan of the chest c/w mild ILDz, follow-up CT 12/2022\par -complete rheumatologic blood work\par -Hold Rx\par Etiology will depend on the causative agent possibilities include: idiopathic pulmonary fibrosis (UIP), NSIP (nonspecific interstitial pneumonia) , respiratory bronchiolitis in someone who is a smoker, drug induced lung disease, hypersensitivity pneumonitis, BOOP, sarcoidosis, chronic congestive heart failure,  rheumatologic disorder induced interstitial lung disease. Optimal diagnosing will include rheumatological panel which would include ESR, ASHELY, ANCA, ARNP, CCP, rheumatoid factor, hypersensitivity panel, JOE1, scleroderma antibodies, sjogren's syndrome antibodies; biopsy will be necessary to definitively determine the etiology unless the CT scan findings are specific for UIP. Therapy will be based on diagnostics. \par \par \par Problem 5:  Allergies/PND \par -continue Olopatadine 0.6% 1 sniff BID \par Environmental measures for allergies were encouraged including mattress and pillow covers, air purifier, and environmental controls. \par \par Problem 6: GERD\par -continue Pepcid 40mg QHS \par -Rule of 2s: avoid eating too much, eating too late, eating too spicy, eating two hours before bed.\par -Things to avoid including overeating, spicy foods, tight clothing, eating within three hours of bed, this list is not all inclusive. \par -For treatment of reflux, possible options discussed including diet control, H2 blockers, PPIs, as well as coating motility agents discussed as treatment options. Timing of meals and proximity of last meal to sleep were discussed. If symptoms persist, a formal gastrointestinal evaluation is needed. \par \par Problem 7: Chronic Respiratory failure \par -Currently on Oxygen 2L NC\par \par Problem 8: Balance issues \par -Complete balance therapy \par \par problem 9: cardiac disease\par -recommended to continue to follow up with Cardiologist (Dr. Persaud)\par \par problem 10: poor breathing mechanics\par -Recommended Wim Hof and Buteyko breathing techniques \par -Proper breathing techniques were reviewed with an emphasis of exhalation. Patient instructed to breath in for 1 second and out for four seconds. Patient was encouraged to not talk while walking. \par \par problem 11: out of shape / overweight\par -Weight loss, exercise, and diet control were discussed and are highly encouraged. Treatment options were given such as, aqua therapy, and contacting a nutritionist. Recommended to use the elliptical, stationary bike, less use of treadmill. Mindful eating was explained to the patient Obesity is associated with worsening asthma, shortness of breath, and potential for cardiac disease, diabetes, and other underlying medical conditions\par \par problem : health maintenance \par -Antivax COVID-19\par -Recommend [employer] Knee Sleeve\par -recommended yearly flu shot after October 15\par -recommended strep pneumonia vaccines: Prevnar-13 vaccine, followed by Pneumo vaccine 23 one year following after 65\par -recommended early intervention for Upper Respiratory Infections (URIs)\par -recommended regular osteoporosis evaluations\par -recommended early dermatological evaluations\par -recommended after the age of 50 to the age of 70, colonoscopy every 5 years\par \par F/U in 6-8 weeks.\par She is encouraged to call with any changes, concerns, or questions

## 2022-01-28 NOTE — REASON FOR VISIT
[Follow-Up] : a follow-up visit [TextBox_44] : sob, restrictive dysfunction, COPD, pulmonary emboli, allergies, gerd

## 2022-01-28 NOTE — ADDENDUM
[FreeTextEntry1] : Documented by Leonard Johnson acting as a scribe for Dr. Lucas Prado on 01/28/2022\par \par All medical record entries made by the scribe were at my, Dr. Lucas Prado's, direction and personally dictated by me on 01/28/2022. I have reviewed the chart and agree that the record accurately reflects my personal performance of the history, physical exam, assessment, and plan. I have also personally directed, reviewed, and agree with the discharge instructions.

## 2022-01-28 NOTE — PHYSICAL EXAM
[No Acute Distress] : no acute distress [Normal Oropharynx] : normal oropharynx [III] : Mallampati Class: III [Normal Appearance] : normal appearance [No Neck Mass] : no neck mass [Normal Rate/Rhythm] : normal rate/rhythm [Normal S1, S2] : normal s1, s2 [No Murmurs] : no murmurs [No Resp Distress] : no resp distress [Clear to Auscultation Bilaterally] : clear to auscultation bilaterally [Kyphosis] : kyphosis [Benign] : benign [Normal Gait] : normal gait [No Clubbing] : no clubbing [No Cyanosis] : no cyanosis [No Edema] : no edema [FROM] : FROM [Normal Color/ Pigmentation] : normal color/ pigmentation [No Focal Deficits] : no focal deficits [Oriented x3] : oriented x3 [Normal Affect] : normal affect [TextBox_2] : on oxygen [TextBox_68] : I:E 1:3, distant breath sounds [TextBox_105] : 1+ LE edema

## 2022-01-28 NOTE — PROCEDURE
[FreeTextEntry1] : FENO was 21; a normal value being less than 25. Fractional exhaled nitric oxide (FENO) is regarded as a simple, noninvasive method for assessing eosinophilic airway inflammation. Produced by a variety of cells within the lung, nitric oxide (NO) concentrations are generally low in healthy individuals. However, high concentrations of NO appear to be involved in nonspecific host defense mechanisms and chronic inflammatory  diseases such as asthma. The American Thoracic Society (ATS) therefore recommended using FENO to aid in the diagnosis and monitoring of eosinophilic airway inflammation and asthma, and for identifying steroid responsive individuals whose chronic respiratory symptoms may be caused by airway inflammation \par \par CT chest (12.8.2021) revealed Mild peripheral reticulation and bronchiectasis. No honeycombing. Right upper lobe subpleural 2 mm nodule. Suggestion of pulmonary artery hypertension. One year follow-up CT recommended.\par 2 cm right thyroid lobe nodule. Ultrasound recommended for complete evaluation.\par \par \par PFT revealed mild-moderate restrictive dysfunction, with a FEV1 of 1.02L, which is 62% of predicted, with a normal flow volume loop

## 2022-03-03 ENCOUNTER — APPOINTMENT (OUTPATIENT)
Dept: GYNECOLOGIC ONCOLOGY | Facility: CLINIC | Age: 77
End: 2022-03-03
Payer: MEDICARE

## 2022-03-03 PROCEDURE — 99213 OFFICE O/P EST LOW 20 MIN: CPT

## 2022-03-03 RX ORDER — COLD-HOT PACK
EACH MISCELLANEOUS
Refills: 0 | Status: ACTIVE | COMMUNITY

## 2022-03-03 RX ORDER — PNV NO.95/FERROUS FUM/FOLIC AC 28MG-0.8MG
TABLET ORAL
Refills: 0 | Status: ACTIVE | COMMUNITY

## 2022-03-03 RX ORDER — MIRABEGRON 50 MG/1
50 TABLET, FILM COATED, EXTENDED RELEASE ORAL
Refills: 0 | Status: ACTIVE | COMMUNITY

## 2022-04-07 RX ORDER — UMECLIDINIUM BROMIDE AND VILANTEROL TRIFENATATE 62.5; 25 UG/1; UG/1
62.5-25 POWDER RESPIRATORY (INHALATION) DAILY
Qty: 3 | Refills: 1 | Status: ACTIVE | COMMUNITY
Start: 2021-12-02 | End: 1900-01-01

## 2022-05-31 ENCOUNTER — APPOINTMENT (OUTPATIENT)
Dept: PULMONOLOGY | Facility: CLINIC | Age: 77
End: 2022-05-31
Payer: MEDICARE

## 2022-05-31 VITALS
HEIGHT: 60 IN | HEART RATE: 70 BPM | OXYGEN SATURATION: 86 % | RESPIRATION RATE: 16 BRPM | TEMPERATURE: 98 F | WEIGHT: 164 LBS | BODY MASS INDEX: 32.2 KG/M2 | SYSTOLIC BLOOD PRESSURE: 140 MMHG | DIASTOLIC BLOOD PRESSURE: 80 MMHG

## 2022-05-31 PROCEDURE — 99214 OFFICE O/P EST MOD 30 MIN: CPT | Mod: 25

## 2022-05-31 PROCEDURE — 95012 NITRIC OXIDE EXP GAS DETER: CPT

## 2022-05-31 PROCEDURE — 94010 BREATHING CAPACITY TEST: CPT

## 2022-05-31 PROCEDURE — ZZZZZ: CPT

## 2022-05-31 PROCEDURE — 94727 GAS DIL/WSHOT DETER LNG VOL: CPT

## 2022-05-31 PROCEDURE — 94729 DIFFUSING CAPACITY: CPT

## 2022-05-31 RX ORDER — AMOXICILLIN 500 MG/1
500 CAPSULE ORAL
Qty: 21 | Refills: 0 | Status: DISCONTINUED | COMMUNITY
Start: 2022-04-23

## 2022-05-31 RX ORDER — METOPROLOL SUCCINATE 25 MG/1
25 TABLET, EXTENDED RELEASE ORAL
Qty: 90 | Refills: 0 | Status: ACTIVE | COMMUNITY
Start: 2022-05-20

## 2022-05-31 NOTE — ADDENDUM
[FreeTextEntry1] : Documented by Sandra Hernandez acting as a scribe for Dr. Lucas Prado on 05/31/2022 \par \par All medical record entries made by the Scribe were at my, Dr. Lucas Prado's, direction and personally dictated by me on 05/31/2022 . I have reviewed the chart and agree that the record accurately reflects my personal performance of the history, physical exam, assessment and plan. I have also personally directed, reviewed, and agree with the discharge instructions

## 2022-05-31 NOTE — PROCEDURE
[FreeTextEntry1] : FENO was 21 ; a normal value being less than 25\par Fractional exhaled nitric oxide (FENO) is regarded as a simple, noninvasive method for assessing eosinophilic airway inflammation. Produced by a variety of cells within the lung, nitric oxide (NO) concentrations are generally low in healthy individuals. However, high concentrations of NO appear to be involved in nonspecific host defense mechanisms and chronic inflammatory diseases such as asthma. The American Thoracic Society (ATS) therefore has recommended using FENO to aid in the diagnosis and monitoring of eosinophilic airway inflammation and asthma, and for identifying steroid responsive individuals whose chronic respiratory symptoms may be caused by airway inflammation. \par \par Full PFT revealed mild restrictive dysfunction, with a FEV1 of 1.10L,which is 62% of predicted,  moderately reduced lung volumes, and a moderately reduced diffusion of 8.5, which is 47% of predicted with a normal flow volume loop\par \par \par

## 2022-05-31 NOTE — HISTORY OF PRESENT ILLNESS
[TextBox_4] : Ms. YEUNG is a 76 year old female with a history of left sided breast cancer, s/p lumpectomy and radiation theray (2008), uterine cancer, s/p ELIDA, diverticulitis, kyphoscoliosis, hidradenitis supertavia, thyroid nodules, 40 pack year smoker, bronchitis, vaginal cancer (2013) s/p radiation, blood clots in both lungs (PE), history of SVT, arthritis  presenting to the office today for a follow-up pulmonary evaluation. Her chief complaint is\par \par -she notes persistent knee issues \par -she notes intermittent night sweats \par -she notes intermittent constipation\par -she notes right eye vision has decreased due to devolving cataracts \par -she notes good quality of sleep \par -she notes getting enough sleep of about 9 hrs \par -she notes walking is limited due to arthritic knee\par -she notes walking in house for exercise \par -she notes weight is mostly stable \par -she denies coughing \par -she denies wheezing \par -she notes LEONG \par -she notes s/p dental procedure \par \par -denies any headaches, nausea, vomiting, fever, chills, chest pain, chest pressure, diarrhea, dysphagia, dizziness, leg swelling, itchy eyes, itchy ears, heartburn, reflux, or sour taste in the mouth.

## 2022-05-31 NOTE — PHYSICAL EXAM
[No Acute Distress] : no acute distress [Normal Oropharynx] : normal oropharynx [III] : Mallampati Class: III [Normal Appearance] : normal appearance [No Neck Mass] : no neck mass [Normal Rate/Rhythm] : normal rate/rhythm [Normal S1, S2] : normal s1, s2 [No Murmurs] : no murmurs [No Resp Distress] : no resp distress [Clear to Auscultation Bilaterally] : clear to auscultation bilaterally [Kyphosis] : kyphosis [Benign] : benign [Normal Gait] : normal gait [No Clubbing] : no clubbing [No Cyanosis] : no cyanosis [No Edema] : no edema [FROM] : FROM [Normal Color/ Pigmentation] : normal color/ pigmentation [No Focal Deficits] : no focal deficits [Oriented x3] : oriented x3 [Normal Affect] : normal affect [TextBox_2] : on oxygen [TextBox_68] : I:E ratio 1:3; clear

## 2022-05-31 NOTE — ASSESSMENT
[FreeTextEntry1] : Ms. YEUNG is a 76 year old female with a history of left sided breast cancer, former 40+ pack smoker, s/p lumpectomy and radiation theray (2008), uterine cancer, s/p ELIDA, diverticulitis, kyphoscoliosis, hidradenitis supertavia, thyroid nodules, 40 pack year smoker, bronchitis, vaginal cancer (2013) s/p radiation, s/p SVT, blood clots in both lungs (PE), arthritis, and balance issues  presenting to the office today for a follow-up pulmonary evaluation. Her chief complaint is sob, restrictive dysfunction, COPD, pulmonary emboli, allergies, gerd - mild ILDz- stable \par \par \par Her shortness of breath is multifactorial due to:\par -poor mechanics of breathing \par -out of shape / overweight\par -pulmonary disease\par    -Full PFTs to follow\par -cardiac disease (Dr. Persaud ([facility]))\par \par Problem 1: restrictive dysfunction (kyphoscoliosis)\par -complete CBC \par -complete iron studies \par \par Problem 2: History of pulmonary emboli \par -Complete yearly ECHOcardiogram ([place])\par -continue Xarelto 15 mg \par Blood clots are noted w/in your lungs diagnosed by either abnormal CTPA or ventilation perfusion scan. You will need a hypercoagulable work up done by a hematologist to attempt to identify the etiology of this problem. Anticoagulation will be needed for at least 6 months- drugs included are Coumadin, lovenox, arixtra, or another agent. Repeat CTPA or VQ scan will be needed in approximately 6-8 weeks. An echocardiogram may be needed to assess for right ventricular function and pulmonary hypertension. The importance of hydration, ambulation, and regular/consistent diet are extremely important. \par \par \par Problem 3: COPD \par -continue Anoro 1 inhalation QD \par COPD is a progressive disease and although it can’t be cured , appropriate management can slow its progression, reduce frequency and severity of exacerbations, and improve symptoms and the patient quality of life. Hospitalizations are the greatest contributor to the total COPD costs and account for up to 87% of total COPD related costs. Exacerbations are the main cause of admissions and subsequently account for the 40-75% of COPD costs. Inhaled maintenance therapy reduces the incidence of exacerbations in patients with stable COPD. Incorrect inhaler use and nonadherence are major obstacles to achieving COPD treatment goals. Many COPD patients have challenges (impaired inhalation, limited dexterity, reduced cognition: that limit their ability to correctly use their COPD treatment devices resulting in reduced symptom control. Of most importance is smoking cessation and early intervention with respiratory illnesses and contemplation for pulmonary rehab to enhance quality of life. \par Inhaler technique reviewed as well as oral hygiene techniques reviewed with patient. Avoidance of cold air, extremes of temperature, rescue inhaler should be used before exercise. Order of medication reviewed with patient. Recommended use of a cool mist humidifier in the bedroom. \par \par \par Problem 4: ILDz\par -s/p high resolution CT scan of the chest c/w mild ILDz, follow-up CT 12/2022\par -complete rheumatologic blood work- [place]\par -Hold Rx\par Etiology will depend on the causative agent possibilities include: idiopathic pulmonary fibrosis (UIP), NSIP (nonspecific interstitial pneumonia) , respiratory bronchiolitis in someone who is a smoker, drug induced lung disease, hypersensitivity pneumonitis, BOOP, sarcoidosis, chronic congestive heart failure,  rheumatologic disorder induced interstitial lung disease. Optimal diagnosing will include rheumatological panel which would include ESR, ASHELY, ANCA, ARNP, CCP, rheumatoid factor, hypersensitivity panel, JOE1, scleroderma antibodies, sjogren's syndrome antibodies; biopsy will be necessary to definitively determine the etiology unless the CT scan findings are specific for UIP. Therapy will be based on diagnostics. \par \par \par Problem 5:  Allergies/PND \par -continue Olopatadine 0.6% 1 sniff BID \par Environmental measures for allergies were encouraged including mattress and pillow covers, air purifier, and environmental controls. \par \par Problem 6: GERD\par -continue Pepcid 40mg QHS \par -Rule of 2s: avoid eating too much, eating too late, eating too spicy, eating two hours before bed.\par -Things to avoid including overeating, spicy foods, tight clothing, eating within three hours of bed, this list is not all inclusive. \par -For treatment of reflux, possible options discussed including diet control, H2 blockers, PPIs, as well as coating motility agents discussed as treatment options. Timing of meals and proximity of last meal to sleep were discussed. If symptoms persist, a formal gastrointestinal evaluation is needed. \par \par Problem 7: Chronic Respiratory failure \par -Currently on Oxygen 2L NC\par -Patient is in a stable state\par -Tests results (overnight oximetry, 6 minute walk test, exercise study, arterial blood gas, etc.) reveal that oxygen is necessary for daily life- optimally it should be used with any activity and during sleep. \par \par Problem 8: Balance issues \par -Complete balance therapy \par \par problem 9: cardiac disease\par -recommended to continue to follow up with Cardiologist (Dr. Persaud)\par \par problem 10: poor breathing mechanics\par -Recommended Wim Hof and Buteyko breathing techniques \par -Proper breathing techniques were reviewed with an emphasis of exhalation. Patient instructed to breath in for 1 second and out for four seconds. Patient was encouraged to not talk while walking. \par \par problem 11: out of shape / overweight\par -Weight loss, exercise, and diet control were discussed and are highly encouraged. Treatment options were given such as, aqua therapy, and contacting a nutritionist. Recommended to use the elliptical, stationary bike, less use of treadmill. Mindful eating was explained to the patient Obesity is associated with worsening asthma, shortness of breath, and potential for cardiac disease, diabetes, and other underlying medical conditions\par \par problem : health maintenance \par -Antivax COVID-19\par -Recommend [employer] Knee Sleeve\par -recommended yearly flu shot after October 15\par -recommended strep pneumonia vaccines: Prevnar-13 vaccine, followed by Pneumo vaccine 23 one year following after 65\par -recommended early intervention for Upper Respiratory Infections (URIs)\par -recommended regular osteoporosis evaluations\par -recommended early dermatological evaluations\par -recommended after the age of 50 to the age of 70, colonoscopy every 5 years\par \par F/U in 6-8 weeks.\par She is encouraged to call with any changes, concerns, or questions

## 2022-06-09 ENCOUNTER — RX RENEWAL (OUTPATIENT)
Age: 77
End: 2022-06-09

## 2022-08-12 ENCOUNTER — APPOINTMENT (OUTPATIENT)
Dept: PULMONOLOGY | Facility: CLINIC | Age: 77
End: 2022-08-12

## 2022-08-12 VITALS
TEMPERATURE: 97.5 F | HEIGHT: 61 IN | SYSTOLIC BLOOD PRESSURE: 130 MMHG | RESPIRATION RATE: 16 BRPM | OXYGEN SATURATION: 90 % | BODY MASS INDEX: 31.15 KG/M2 | DIASTOLIC BLOOD PRESSURE: 70 MMHG | HEART RATE: 74 BPM | WEIGHT: 165 LBS

## 2022-08-12 PROCEDURE — ZZZZZ: CPT

## 2022-08-12 PROCEDURE — 99214 OFFICE O/P EST MOD 30 MIN: CPT | Mod: 25

## 2022-08-12 PROCEDURE — 94010 BREATHING CAPACITY TEST: CPT

## 2022-08-12 PROCEDURE — 95012 NITRIC OXIDE EXP GAS DETER: CPT

## 2022-08-12 PROCEDURE — 94727 GAS DIL/WSHOT DETER LNG VOL: CPT

## 2022-08-12 PROCEDURE — 94729 DIFFUSING CAPACITY: CPT

## 2022-08-12 RX ORDER — VERAPAMIL HYDROCHLORIDE 240 MG/1
240 CAPSULE, DELAYED RELEASE PELLETS ORAL
Qty: 90 | Refills: 0 | Status: ACTIVE | COMMUNITY
Start: 2022-06-10

## 2022-08-12 NOTE — ASSESSMENT
[FreeTextEntry1] : Ms. YEUNG is a 76 year old female with a history of left sided breast cancer, former 40+ pack smoker, s/p lumpectomy and radiation theray (2008), uterine cancer, s/p ELIDA, diverticulitis, kyphoscoliosis, hidradenitis supertavia, thyroid nodules, 40 pack year smoker, bronchitis, vaginal cancer (2013) s/p radiation, s/p SVT, blood clots in both lungs (PE), arthritis, and balance issues  presenting to the office today for a follow-up pulmonary evaluation. Her chief complaint is sob, restrictive dysfunction, COPD, pulmonary emboli, allergies, gerd - mild ILDz- stable and improved \par \par \par Her shortness of breath is multifactorial due to:\par -poor mechanics of breathing \par -out of shape / overweight\par -pulmonary disease\par    -restrictive / obstructive dysfunction \par -cardiac disease (Dr. Persaud ([facility]))\par \par Problem 1: restrictive dysfunction (kyphoscoliosis)\par -s/p CBC \par -s/p iron studies \par - "positive" weight loss\par \par Problem 1A: Anemia - (hgb 12.2)\par - f/p with hematologist \par \par Problem 2: History of pulmonary emboli \par -Complete yearly ECHOcardiogram ([place])\par -continue Xarelto 15 mg \par Blood clots are noted w/in your lungs diagnosed by either abnormal CTPA or ventilation perfusion scan. You will need a hypercoagulable work up done by a hematologist to attempt to identify the etiology of this problem. Anticoagulation will be needed for at least 6 months- drugs included are Coumadin, lovenox, arixtra, or another agent. Repeat CTPA or VQ scan will be needed in approximately 6-8 weeks. An echocardiogram may be needed to assess for right ventricular function and pulmonary hypertension. The importance of hydration, ambulation, and regular/consistent diet are extremely important. \par \par Prob\par Problem 3: COPD \par -continue Anoro 1 inhalation QD \par COPD is a progressive disease and although it can’t be cured , appropriate management can slow its progression, reduce frequency and severity of exacerbations, and improve symptoms and the patient quality of life. Hospitalizations are the greatest contributor to the total COPD costs and account for up to 87% of total COPD related costs. Exacerbations are the main cause of admissions and subsequently account for the 40-75% of COPD costs. Inhaled maintenance therapy reduces the incidence of exacerbations in patients with stable COPD. Incorrect inhaler use and nonadherence are major obstacles to achieving COPD treatment goals. Many COPD patients have challenges (impaired inhalation, limited dexterity, reduced cognition: that limit their ability to correctly use their COPD treatment devices resulting in reduced symptom control. Of most importance is smoking cessation and early intervention with respiratory illnesses and contemplation for pulmonary rehab to enhance quality of life. \par Inhaler technique reviewed as well as oral hygiene techniques reviewed with patient. Avoidance of cold air, extremes of temperature, rescue inhaler should be used before exercise. Order of medication reviewed with patient. Recommended use of a cool mist humidifier in the bedroom. \par \par \par Problem 4: ILDz (stable)\par -s/p high resolution CT scan of the chest c/w mild ILDz, follow-up CT 12/2022\par -complete rheumatologic blood work- NC\par -Hold Rx at reserve \par Etiology will depend on the causative agent possibilities include: idiopathic pulmonary fibrosis (UIP), NSIP (nonspecific interstitial pneumonia) , respiratory bronchiolitis in someone who is a smoker, drug induced lung disease, hypersensitivity pneumonitis, BOOP, sarcoidosis, chronic congestive heart failure,  rheumatologic disorder induced interstitial lung disease. Optimal diagnosing will include rheumatological panel which would include ESR, ASHELY, ANCA, ARNP, CCP, rheumatoid factor, hypersensitivity panel, JOE1, scleroderma antibodies, sjogren's syndrome antibodies; biopsy will be necessary to definitively determine the etiology unless the CT scan findings are specific for UIP. Therapy will be based on diagnostics. \par \par \par Problem 5:  Allergies/PND \par -continue Olopatadine 0.6% 1 sniff BID \par Environmental measures for allergies were encouraged including mattress and pillow covers, air purifier, and environmental controls. \par \par Problem 6: GERD\par -continue Pepcid 40mg QHS \par -Rule of 2s: avoid eating too much, eating too late, eating too spicy, eating two hours before bed.\par -Things to avoid including overeating, spicy foods, tight clothing, eating within three hours of bed, this list is not all inclusive. \par -For treatment of reflux, possible options discussed including diet control, H2 blockers, PPIs, as well as coating motility agents discussed as treatment options. Timing of meals and proximity of last meal to sleep were discussed. If symptoms persist, a formal gastrointestinal evaluation is needed. \par \par Problem 7: Chronic Respiratory failure \par -Currently on Oxygen 2L NC\par -Patient is in a stable state\par -Tests results (overnight oximetry, 6 minute walk test, exercise study, arterial blood gas, etc.) reveal that oxygen is necessary for daily life- optimally it should be used with any activity and during sleep. \par \par Problem 8: Balance issues \par -Complete balance therapy \par \par problem 9: cardiac disease\par -recommended to continue to follow up with Cardiologist (Dr. Persaud)\par \par problem 10: poor breathing mechanics\par - Recommended "10-Day Detox" by Daniel Hodges for 2-3 weeks followed by probiotics \par -Recommended Wim Hof and Buteyko breathing techniques \par -Proper breathing techniques were reviewed with an emphasis of exhalation. Patient instructed to breath in for 1 second and out for four seconds. Patient was encouraged to not talk while walking. \par \par problem 11: out of shape / overweight\par -Weight loss, exercise, and diet control were discussed and are highly encouraged. Treatment options were given such as, aqua therapy, and contacting a nutritionist. Recommended to use the elliptical, stationary bike, less use of treadmill. Mindful eating was explained to the patient Obesity is associated with worsening asthma, shortness of breath, and potential for cardiac disease, diabetes, and other underlying medical conditions\par \par problem : health maintenance \par -Antivax COVID-19\par -Recommend [employer] Knee Sleeve\par -recommended yearly flu shot after October 15\par -recommended strep pneumonia vaccines: Prevnar-13 vaccine, followed by Pneumo vaccine 23 one year following after 65\par -recommended early intervention for Upper Respiratory Infections (URIs)\par -recommended regular osteoporosis evaluations\par -recommended early dermatological evaluations\par -recommended after the age of 50 to the age of 70, colonoscopy every 5 years\par \par F/U in 6-8 weeks.\par She is encouraged to call with any changes, concerns, or questions

## 2022-08-12 NOTE — PROCEDURE
[FreeTextEntry1] : FENO was 24 ; normal value being less than 25\par Fractional exhaled nitric oxide (FENO) is regarded as a simple, noninvasive method for assessing eosinophilic airway inflammation. Produced by a variety of cells within the lung, nitric oxide (NO) concentrations are generally low in healthy individuals. However, high concentrations of NO appear to be involved in nonspecific host defense mechanisms and chronic inflammatory diseases such as asthma. The American Thoracic Society (ATS) therefore has recommended using FENO to aid in the diagnosis and monitoring of eosinophilic airway inflammation and asthma, and for identifying steroid responsive individuals whose chronic respiratory symptoms may be airway inflammation. \par \par FULL PFTs reveals mild restrictive flows; FEV1 was  1.28L which is 74% of predicted; mild low lung volumes; moderate diffusion of 10.4, which is 57% of predicted; normal flow volume loop \par

## 2022-08-12 NOTE — HISTORY OF PRESENT ILLNESS
[TextBox_4] : Ms. YEUNG is a 77 year old female with a history of left sided breast cancer, s/p lumpectomy and radiation theray (2008), uterine cancer, s/p ELIDA, diverticulitis, kyphoscoliosis, hidradenitis supertavia, thyroid nodules, 40 pack year smoker, bronchitis, vaginal cancer (2013) s/p radiation, blood clots in both lungs (PE), history of SVT, arthritis  presenting to the office today for a follow-up pulmonary evaluation. Her chief complaint is\par - she notes she has been feeling okay \par - she notes no headaches, nausea, or vomiting \par - no chest pains / pressure \par - she has been constipated\par - no difficult swallowing\par - she notes she sleeps at least 7 hrs. She wakes up to use the bathroom. \par - she notes she takes her med around 10pm. \par - no heartburn / reflux \par - she will be going to a rheumatologist soon \par - she did balance rx when she was in rehab but shes interested in it now too \par -She denies any visual issues, headaches, nausea, vomiting, fever, chills, sweats, chest pains, chest pressure, diarrhea, constipation, dysphagia, myalgia, dizziness, leg swelling, leg pain, itchy eyes, itchy ears, heartburn, reflux, or sour taste in the mouth.\par

## 2022-08-12 NOTE — PHYSICAL EXAM
[No Acute Distress] : no acute distress [Normal Oropharynx] : normal oropharynx [III] : Mallampati Class: III [Normal Appearance] : normal appearance [No Neck Mass] : no neck mass [Normal Rate/Rhythm] : normal rate/rhythm [Normal S1, S2] : normal s1, s2 [No Murmurs] : no murmurs [No Resp Distress] : no resp distress [Clear to Auscultation Bilaterally] : clear to auscultation bilaterally [Kyphosis] : kyphosis [Benign] : benign [Normal Gait] : normal gait [No Clubbing] : no clubbing [No Cyanosis] : no cyanosis [No Edema] : no edema [FROM] : FROM [Normal Color/ Pigmentation] : normal color/ pigmentation [No Focal Deficits] : no focal deficits [Oriented x3] : oriented x3 [Normal Affect] : normal affect [TextBox_2] : on oxygen, kyphscoliosis  [TextBox_54] : 1/6 sys murmur  [TextBox_68] : I:E ratio 1:3; clear  [TextBox_105] : 1

## 2022-08-12 NOTE — ADDENDUM
[FreeTextEntry1] : Documented by Tracey Frazier acting as a scribe for Dr. Lucas Prado on 08/12/2022 \par \par All medical record entries made by the Scribe were at my, Dr. Lucas Prado's, direction and personally dictated by me on 08/12/2022 . I have reviewed the chart and agree that the record accurately reflects my personal performance of the history, physical exam, assessment and plan. I have also personally directed, reviewed, and agree with the discharge instructions.

## 2022-09-08 ENCOUNTER — APPOINTMENT (OUTPATIENT)
Dept: GYNECOLOGIC ONCOLOGY | Facility: CLINIC | Age: 77
End: 2022-09-08

## 2022-12-19 ENCOUNTER — APPOINTMENT (OUTPATIENT)
Dept: CT IMAGING | Facility: IMAGING CENTER | Age: 77
End: 2022-12-19

## 2022-12-19 ENCOUNTER — OUTPATIENT (OUTPATIENT)
Dept: OUTPATIENT SERVICES | Facility: HOSPITAL | Age: 77
LOS: 1 days | End: 2022-12-19
Payer: MEDICARE

## 2022-12-19 DIAGNOSIS — R93.89 ABNORMAL FINDINGS ON DIAGNOSTIC IMAGING OF OTHER SPECIFIED BODY STRUCTURES: ICD-10-CM

## 2022-12-19 DIAGNOSIS — Z98.890 OTHER SPECIFIED POSTPROCEDURAL STATES: Chronic | ICD-10-CM

## 2022-12-19 DIAGNOSIS — Z90.710 ACQUIRED ABSENCE OF BOTH CERVIX AND UTERUS: Chronic | ICD-10-CM

## 2022-12-19 PROCEDURE — 71250 CT THORAX DX C-: CPT

## 2022-12-19 PROCEDURE — 71250 CT THORAX DX C-: CPT | Mod: 26,MH

## 2022-12-25 ENCOUNTER — EMERGENCY (EMERGENCY)
Facility: HOSPITAL | Age: 77
LOS: 1 days | Discharge: HOME | End: 2022-12-25
Attending: EMERGENCY MEDICINE | Admitting: EMERGENCY MEDICINE

## 2022-12-25 VITALS
HEART RATE: 81 BPM | DIASTOLIC BLOOD PRESSURE: 60 MMHG | OXYGEN SATURATION: 100 % | RESPIRATION RATE: 18 BRPM | SYSTOLIC BLOOD PRESSURE: 129 MMHG | TEMPERATURE: 97 F

## 2022-12-25 DIAGNOSIS — Z98.890 OTHER SPECIFIED POSTPROCEDURAL STATES: Chronic | ICD-10-CM

## 2022-12-25 DIAGNOSIS — Z90.710 ACQUIRED ABSENCE OF BOTH CERVIX AND UTERUS: Chronic | ICD-10-CM

## 2022-12-25 LAB
ALBUMIN SERPL ELPH-MCNC: 4.1 G/DL — SIGNIFICANT CHANGE UP (ref 3.3–5)
ALP SERPL-CCNC: 109 U/L — SIGNIFICANT CHANGE UP (ref 40–120)
ALT FLD-CCNC: SIGNIFICANT CHANGE UP U/L (ref 4–33)
ANION GAP SERPL CALC-SCNC: 12 MMOL/L — SIGNIFICANT CHANGE UP (ref 7–14)
APTT BLD: 36.9 SEC — HIGH (ref 27–36.3)
AST SERPL-CCNC: SIGNIFICANT CHANGE UP U/L (ref 4–32)
BASOPHILS # BLD AUTO: 0.04 K/UL — SIGNIFICANT CHANGE UP (ref 0–0.2)
BASOPHILS NFR BLD AUTO: 0.4 % — SIGNIFICANT CHANGE UP (ref 0–2)
BILIRUB SERPL-MCNC: 0.4 MG/DL — SIGNIFICANT CHANGE UP (ref 0.2–1.2)
BUN SERPL-MCNC: 20 MG/DL — SIGNIFICANT CHANGE UP (ref 7–23)
CALCIUM SERPL-MCNC: 9.6 MG/DL — SIGNIFICANT CHANGE UP (ref 8.4–10.5)
CHLORIDE SERPL-SCNC: 99 MMOL/L — SIGNIFICANT CHANGE UP (ref 98–107)
CO2 SERPL-SCNC: 24 MMOL/L — SIGNIFICANT CHANGE UP (ref 22–31)
CREAT SERPL-MCNC: 0.85 MG/DL — SIGNIFICANT CHANGE UP (ref 0.5–1.3)
EGFR: 70 ML/MIN/1.73M2 — SIGNIFICANT CHANGE UP
EOSINOPHIL # BLD AUTO: 0.2 K/UL — SIGNIFICANT CHANGE UP (ref 0–0.5)
EOSINOPHIL NFR BLD AUTO: 2.1 % — SIGNIFICANT CHANGE UP (ref 0–6)
GLUCOSE SERPL-MCNC: 92 MG/DL — SIGNIFICANT CHANGE UP (ref 70–99)
HCT VFR BLD CALC: 40.1 % — SIGNIFICANT CHANGE UP (ref 34.5–45)
HGB BLD-MCNC: 13.2 G/DL — SIGNIFICANT CHANGE UP (ref 11.5–15.5)
IANC: 6.84 K/UL — SIGNIFICANT CHANGE UP (ref 1.8–7.4)
IMM GRANULOCYTES NFR BLD AUTO: 0.4 % — SIGNIFICANT CHANGE UP (ref 0–0.9)
INR BLD: 1.12 RATIO — SIGNIFICANT CHANGE UP (ref 0.88–1.16)
LYMPHOCYTES # BLD AUTO: 1.22 K/UL — SIGNIFICANT CHANGE UP (ref 1–3.3)
LYMPHOCYTES # BLD AUTO: 13.1 % — SIGNIFICANT CHANGE UP (ref 13–44)
MCHC RBC-ENTMCNC: 31.4 PG — SIGNIFICANT CHANGE UP (ref 27–34)
MCHC RBC-ENTMCNC: 32.9 GM/DL — SIGNIFICANT CHANGE UP (ref 32–36)
MCV RBC AUTO: 95.5 FL — SIGNIFICANT CHANGE UP (ref 80–100)
MONOCYTES # BLD AUTO: 0.97 K/UL — HIGH (ref 0–0.9)
MONOCYTES NFR BLD AUTO: 10.4 % — SIGNIFICANT CHANGE UP (ref 2–14)
NEUTROPHILS # BLD AUTO: 6.84 K/UL — SIGNIFICANT CHANGE UP (ref 1.8–7.4)
NEUTROPHILS NFR BLD AUTO: 73.6 % — SIGNIFICANT CHANGE UP (ref 43–77)
NRBC # BLD: 0 /100 WBCS — SIGNIFICANT CHANGE UP (ref 0–0)
NRBC # FLD: 0 K/UL — SIGNIFICANT CHANGE UP (ref 0–0)
PLATELET # BLD AUTO: 240 K/UL — SIGNIFICANT CHANGE UP (ref 150–400)
POTASSIUM SERPL-MCNC: SIGNIFICANT CHANGE UP MMOL/L (ref 3.5–5.3)
POTASSIUM SERPL-SCNC: SIGNIFICANT CHANGE UP MMOL/L (ref 3.5–5.3)
PROT SERPL-MCNC: SIGNIFICANT CHANGE UP G/DL (ref 6–8.3)
PROTHROM AB SERPL-ACNC: 13 SEC — SIGNIFICANT CHANGE UP (ref 10.5–13.4)
RBC # BLD: 4.2 M/UL — SIGNIFICANT CHANGE UP (ref 3.8–5.2)
RBC # FLD: 14.2 % — SIGNIFICANT CHANGE UP (ref 10.3–14.5)
SODIUM SERPL-SCNC: 135 MMOL/L — SIGNIFICANT CHANGE UP (ref 135–145)
WBC # BLD: 9.31 K/UL — SIGNIFICANT CHANGE UP (ref 3.8–10.5)
WBC # FLD AUTO: 9.31 K/UL — SIGNIFICANT CHANGE UP (ref 3.8–10.5)

## 2022-12-25 PROCEDURE — 72125 CT NECK SPINE W/O DYE: CPT | Mod: 26,MA

## 2022-12-25 PROCEDURE — 99284 EMERGENCY DEPT VISIT MOD MDM: CPT | Mod: GC

## 2022-12-25 PROCEDURE — 70450 CT HEAD/BRAIN W/O DYE: CPT | Mod: 26,MA

## 2022-12-25 PROCEDURE — 70486 CT MAXILLOFACIAL W/O DYE: CPT | Mod: 26,MA

## 2022-12-25 PROCEDURE — 73562 X-RAY EXAM OF KNEE 3: CPT | Mod: 26,RT

## 2022-12-25 RX ORDER — METOPROLOL TARTRATE 50 MG
0 TABLET ORAL
Qty: 0 | Refills: 0 | DISCHARGE

## 2022-12-25 RX ORDER — VERAPAMIL HCL 240 MG
0 CAPSULE, EXTENDED RELEASE PELLETS 24 HR ORAL
Qty: 0 | Refills: 0 | DISCHARGE

## 2022-12-25 RX ORDER — ASPIRIN/CALCIUM CARB/MAGNESIUM 324 MG
0 TABLET ORAL
Qty: 0 | Refills: 0 | DISCHARGE

## 2022-12-25 RX ORDER — ACETAMINOPHEN 500 MG
975 TABLET ORAL ONCE
Refills: 0 | Status: COMPLETED | OUTPATIENT
Start: 2022-12-25 | End: 2022-12-25

## 2022-12-25 RX ORDER — RIVAROXABAN 15 MG-20MG
0 KIT ORAL
Qty: 0 | Refills: 0 | DISCHARGE

## 2022-12-25 RX ORDER — MIRABEGRON 50 MG/1
0 TABLET, EXTENDED RELEASE ORAL
Qty: 0 | Refills: 0 | DISCHARGE

## 2022-12-25 RX ADMIN — Medication 975 MILLIGRAM(S): at 22:57

## 2022-12-25 NOTE — ED PROVIDER NOTE - PROGRESS NOTE DETAILS
[place] PGY3: Labs unremarkable. CTH/CS negative for fracture or bleed, but shows Lt scalp hematoma and Lt periorbital soft tissue swelling; XR knee shows effusion w/o fracture. Pt was re-evaluated at bedside, VSS, feeling better overall. Results were discussed with patient as well as return precautions and follow up plan with PCP. Applied ace wrap to Rt knee. Time was taken to answer any questions that the patient had before providing them with discharge paperwork. Discussed that patient should follow up with PCP about continuing xarelto. Cipriano SPENCER: Patient signed out pending CT imaging and likely d/c home. CT imaging negative for acute injury. Incidental findings discussed with patient. She states she is aware of the thyroid nodule. Patient reports she is comfortable going home. Plan to wrap right knee for comfort. I advised follow up with pcp. Exam: NAD, left forehead hematoma, left periorbital ecchymosis, palpable hematoma to left upper face, RRR, lungs CTA, abd soft, nt, nd, pelvis is stable, right knee with mild swelling, can flex and extend, no laxity, not warm to touch, + ecchymosis. Plan for d/c home and outpatient follow up.

## 2022-12-25 NOTE — ED PROVIDER NOTE - PHYSICAL EXAMINATION
ATTENDING PHYSICAL EXAM  GEN - Sitting up on stretcher.  Answers questions appropriately.  RR 14reg.  O2 92% 2L NC  HEAD - + right forehead hematoma.  + right upper eyelid hematoma.  No bony step-off appreciated.  EOMI. PERRL.  Nose and ears clear.  Throat clear.    NECK: Neck supple, no c-vert tenderness of step-off  PULMONARY - CTA b/l, symmetric breath sounds, no w/r/r  CARDIAC -s1s2, RRR, no M,R,G  ABDOMEN - +NABS, ND, NT, soft, no guarding, no rebound, no masses   BACK - + scoliosis.  No vert tenderness  EXTREMITIES - Right knee with limited ROM due to pain.  + ecchymosis.  No laxity or bony deformity appreciated.  Distal PMS intact.  NEUROLOGIC - alert, CN 2-12 intact, sensation nl, coordination nl, finger to nose nl, romberg negative, motor 5/5 RUE/LUE/RLE/LLE/EHL/Plantar flexion ATTENDING PHYSICAL EXAM  GEN - Sitting up on stretcher.  Answers questions appropriately.  RR 14reg.  O2 92% 2L NC  HEAD - + left forehead hematoma.  + left upper eyelid hematoma.  No bony step-off appreciated.  EOMI. PERRL.  Nose and ears clear.  Throat clear.    NECK: Neck supple, no c-vert tenderness of step-off  PULMONARY - CTA b/l, symmetric breath sounds, no w/r/r  CARDIAC -s1s2, RRR, no M,R,G  ABDOMEN - +NABS, ND, NT, soft, no guarding, no rebound, no masses   BACK - + scoliosis.  No vert tenderness  EXTREMITIES - Right knee with limited ROM due to pain.  + ecchymosis.  No laxity or bony deformity appreciated.  Distal PMS intact.  NEUROLOGIC - alert, CN 2-12 intact, sensation nl, coordination nl, finger to nose nl, romberg negative, motor 5/5 RUE/LUE/RLE/LLE/EHL/Plantar flexion

## 2022-12-25 NOTE — ED PROVIDER NOTE - CLINICAL SUMMARY MEDICAL DECISION MAKING FREE TEXT BOX
37-year-old female on Xarelto status post trip and fall last night with head trauma and right knee trauma.  No neuro deficits.  Will check labs, coags, CT brain and maxillofacial, and right knee x-ray.  Tylenol for pain.

## 2022-12-25 NOTE — ED ADULT NURSE NOTE - OBJECTIVE STATEMENT
Pt. received to room 5 A&Ox4 ambulatory on 2L NC at home normal sat approx. 95% presenting s/p fall. pt. endorses a singular slip and fall yesterday, denies LOC, +Head Trauma, +AC use (Plavix). eccymosis noted to the L side of the face with some swelling. no neuro deficits noted. Denies HA, dizziness, blurry vision. NAD noted at this time. respirations even and unlabored. pending MD oGre. comfort measures provided. safety precautions maintained.

## 2022-12-25 NOTE — ED ADULT NURSE NOTE - HEART RATE (BEATS/MIN)
MVP Therapy POC signed 5/19/21 by provider, faxed 5/19/21 to [phone]; order and confirmation placed in bin for central scan 81

## 2022-12-25 NOTE — ED PROVIDER NOTE - OBJECTIVE STATEMENT
Cherry - 77-year-old female with son at bedside.  Reports she tripped and fell last night at approximately 7:30 PM while carrying pots.  No preceding lightheadedness or dizziness.  Head her right knee and face on ground.  No LOC.  Able to get up with assistance, and ambulate normally.  To ED now for concern about head trauma.  Denies any nausea, vomiting, blurry vision, confusion, or dizziness.  Denies neck pain or any extremity weakness or numbness.  Positive pain with movement of right knee.  Took Tylenol at 4 PM.  On home oxygen 2 L nasal cannula due to history of PE and COPD.  Takes Xarelto since PE.

## 2022-12-25 NOTE — ED ADULT NURSE REASSESSMENT NOTE - NS ED NURSE REASSESS COMMENT FT1
pt. remains A&Ox4, awake and resting. pt. offers no new complaints at this time. no acute distress noted. respirations even and unlabored. VS as noted. due medications given. pending CTr.

## 2022-12-25 NOTE — ED PROVIDER NOTE - PATIENT PORTAL LINK FT
You can access the FollowMyHealth Patient Portal offered by [facility] by registering at the following website: [email or web address]. By joining [employer]’s FollowMyHealth portal, you will also be able to view your health information using other applications (apps) compatible with our system.

## 2022-12-25 NOTE — ED PROVIDER NOTE - NSICDXPASTSURGICALHX_GEN_ALL_CORE_FT
PAST SURGICAL HISTORY:  H/O total hysterectomy     History of back surgery     History of lumpectomy

## 2022-12-25 NOTE — ED PROVIDER NOTE - NSFOLLOWUPINSTRUCTIONS_ED_ALL_ED_FT
You were seen in the Emergency Room for head injury.    You received a CT scan that was negative for skull/neck fracture or bleed in brain. The CT did show small scalp hematoma and incidental Rt thyroid nodule.    You also received and XRay of the right knee, which was negative for fracture and showed some swelling around the joint.    An ace wrap was placed on the knee for comfort and compressions. Please rest, ice, and elevate the extremity as needed until able to follow up with doctor.    Please reach out to your PCP within 1-2 days to discuss continuing or holding off on your xarelto for the next few days.    You can use 500-1000mg Tylenol every 6 hours for pain - as needed.  This is an over-the-counter medications - please respect the warnings on the label.    Return to ED for worsening bruising, bleeding, headache, vision changes, nausea/vomiting, confusion, weakness/numbness in arms or legs, or any other new concerning symptoms.

## 2022-12-25 NOTE — ED ADULT TRIAGE NOTE - CHIEF COMPLAINT QUOTE
Pt. c/o slipped and fell in the Kitchen and fell and hit left side of head and right knee x2 days. Endorses blood thinner use- Plavix. Denies dizziness, headache, blurry vision. Pt has ecchymotic area to left side of face. Pt use 2L NC  at home. PMHx: Breast cancer with left lumpectomy, PE.

## 2022-12-25 NOTE — ED PROVIDER NOTE - NSICDXPASTMEDICALHX_GEN_ALL_CORE_FT
PAST MEDICAL HISTORY:  Breast cancer     COPD, moderate     Pulmonary embolism     Urinary incontinence     Uterine cancer

## 2022-12-25 NOTE — ED ADULT TRIAGE NOTE - AS TEMP SITE
Nursing Note by Jerry Orozco RMA at 01/03/18 07:56 AM     Author:  Jerry Orozco RMA Service:  (none) Author Type:  Certified Medical Assistant     Filed:  01/03/18 07:56 AM Encounter Date:  1/3/2018 Status:  Signed     :  Jerry Orozco RMA (Certified Medical Assistant)            If provider orders tests at today's visit, patient would like to be contacted via[ML1.1T] phone[ML1.1M] ([employer] or by telephone).  If to contact patient by phone, patient's preferred phone # is [phone] (cell)   and it is[ML1.1T] ok[ML1.1M] to leave message on voice mail or with family member.  If medications are ordered at today's visit, the pharmacy name/location patient would like them to be sent to is[ML1.1T] [facility] [address][ML1.1C].[ML1.1T]         Revision History        User Key Date/Time User Provider Type Action    > ML1.1 01/03/18 07:56 AM Jerry Orozco RMA Certified Medical Assistant Sign    C - Copied, M - Manual, T - Template            
oral

## 2022-12-26 VITALS
SYSTOLIC BLOOD PRESSURE: 121 MMHG | RESPIRATION RATE: 16 BRPM | HEART RATE: 77 BPM | OXYGEN SATURATION: 100 % | DIASTOLIC BLOOD PRESSURE: 75 MMHG | TEMPERATURE: 98 F

## 2022-12-26 LAB
HEMOLYSIS INDEX: 32 — SIGNIFICANT CHANGE UP
POTASSIUM SERPL-MCNC: 4 MMOL/L — SIGNIFICANT CHANGE UP (ref 3.5–5.3)
POTASSIUM SERPL-SCNC: 4 MMOL/L — SIGNIFICANT CHANGE UP (ref 3.5–5.3)

## 2022-12-26 NOTE — ED PROCEDURE NOTE - ATTENDING CONTRIBUTION TO CARE
I have participated in and supervised all key portions of the above procedures and agree with the above documentation. - Cipriano SPENCER

## 2022-12-29 ENCOUNTER — APPOINTMENT (OUTPATIENT)
Dept: PULMONOLOGY | Facility: CLINIC | Age: 77
End: 2022-12-29
Payer: MEDICARE

## 2022-12-29 VITALS
HEART RATE: 78 BPM | SYSTOLIC BLOOD PRESSURE: 122 MMHG | HEIGHT: 61 IN | WEIGHT: 165 LBS | TEMPERATURE: 97.8 F | DIASTOLIC BLOOD PRESSURE: 70 MMHG | RESPIRATION RATE: 16 BRPM | OXYGEN SATURATION: 94 % | BODY MASS INDEX: 31.15 KG/M2

## 2022-12-29 DIAGNOSIS — Z72.820 SLEEP DEPRIVATION: ICD-10-CM

## 2022-12-29 PROCEDURE — 94010 BREATHING CAPACITY TEST: CPT

## 2022-12-29 PROCEDURE — 99214 OFFICE O/P EST MOD 30 MIN: CPT | Mod: 25

## 2022-12-29 PROCEDURE — ZZZZZ: CPT

## 2022-12-29 PROCEDURE — 94729 DIFFUSING CAPACITY: CPT

## 2022-12-29 PROCEDURE — 95012 NITRIC OXIDE EXP GAS DETER: CPT

## 2022-12-29 PROCEDURE — 94727 GAS DIL/WSHOT DETER LNG VOL: CPT

## 2022-12-29 NOTE — PHYSICAL EXAM
[No Acute Distress] : no acute distress [Normal Oropharynx] : normal oropharynx [III] : Mallampati Class: III [Normal Appearance] : normal appearance [No Neck Mass] : no neck mass [Normal Rate/Rhythm] : normal rate/rhythm [Normal S1, S2] : normal s1, s2 [No Murmurs] : no murmurs [No Resp Distress] : no resp distress [Clear to Auscultation Bilaterally] : clear to auscultation bilaterally [Kyphosis] : kyphosis [Benign] : benign [Normal Gait] : normal gait [No Clubbing] : no clubbing [No Cyanosis] : no cyanosis [No Edema] : no edema [FROM] : FROM [Normal Color/ Pigmentation] : normal color/ pigmentation [No Focal Deficits] : no focal deficits [Oriented x3] : oriented x3 [Normal Affect] : normal affect [TextBox_2] : ecchymosis on left face  [TextBox_54] : 2/6 sys murmur  [TextBox_68] : I:E ratio 1:3; inspiratory crackles right long-term up

## 2022-12-29 NOTE — REASON FOR VISIT
[Follow-Up] : a follow-up visit [TextBox_44] : sob, restrictive dysfunction, COPD, pulmonary emboli, allergies, gerd, abnormal CT (RT changes on left)

## 2022-12-29 NOTE — ADDENDUM
[FreeTextEntry1] : Documented by Sandra Hernandez acting as a scribe for Dr. Lucas Prado on 12/29/2022 .\par \par All medical record entries made by the Scribe were at my, Dr. Lucas Prado's, direction and personally dictated by me on 12/29/2022. I have reviewed the chart and agree that the record accurately reflects my personal performance of the history, physical exam, assessment and plan. I have also personally directed, reviewed, and agree with the discharge instructions.

## 2022-12-29 NOTE — ASSESSMENT
[FreeTextEntry1] : Ms. YEUNG is a 77 year old female with a history of left sided breast cancer, former 40+ pack smoker, s/p lumpectomy and radiation theray (2008), uterine cancer, s/p ELIDA, diverticulitis, kyphoscoliosis, hidradenitis supertavia, thyroid nodules, 40 pack year smoker, bronchitis, vaginal cancer (2013) s/p radiation, s/p SVT, blood clots in both lungs (PE), arthritis, and balance issues  presenting to the office today for a follow-up pulmonary evaluation. Her chief complaint is sob, restrictive dysfunction, COPD, pulmonary emboli, allergies, gerd - mild ILDz due ti RT- stable and improved- ?PAH\par \par \par Her shortness of breath is multifactorial due to:\par -poor mechanics of breathing \par -out of shape / overweight\par -pulmonary disease\par    -restrictive / obstructive dysfunction/ ?PAH\par -cardiac disease (Dr. Persaud ([facility]))\par \par Problem 1: restrictive dysfunction (kyphoscoliosis)\par -s/p CBC \par -s/p iron studies \par - "positive" weight loss\par \par Problem 1A: Anemia - (hgb 12.2)\par - f/p with hematologist \par \par Problem 2: History of pulmonary emboli- ?PAH\par -complete Cardiac BNP\par -Complete yearly ECHO cardiogram ([place])\par -continue Xarelto 15 mg \par Blood clots are noted w/in your lungs diagnosed by either abnormal CTPA or ventilation perfusion scan. You will need a hypercoagulable work up done by a hematologist to attempt to identify the etiology of this problem. Anticoagulation will be needed for at least 6 months- drugs included are Coumadin, lovenox, arixtra, or another agent. Repeat CTPA or VQ scan will be needed in approximately 6-8 weeks. An echocardiogram may be needed to assess for right ventricular function and pulmonary hypertension. The importance of hydration, ambulation, and regular/consistent diet are extremely important. \par -Pulmonary hypertension is a disorder of the pulmonary arteries. It is important to distinguish the difference between pulmonary arterial hypertension which is idiopathic versus secondary pulmonary hypertension which is related to heart disease being diastolic dysfunction or congestive heart failure or other forms of pulmonary hypertension. Diagnostics include an initial echocardiogram evaluating the pulmonary artery pressures. If this is abnormal, to proceed with a VQ scan as well as a CTPA, and an eventual right heart catheterization to absolutely confirm the echocardiogram findings. (No medication can be prescribed until the right heart catheterization). If present, the evaluation will include rheumatological blood testing, HIV testing, and potential evaluation for cirrhosis. Drug classes include: PED5 (Revatio, Adcirca); ETRA (Tracleer, Macitentan, Letairis); Soluble guanylate cyclase (Adempas); Prostacyclins (Uptravi, Tyavso, Ventavis, Remodulin, or Orenitram derivatives). \par \par Problem 3: COPD \par -continue Anoro 1 inhalation QD \par COPD is a progressive disease and although it can’t be cured , appropriate management can slow its progression, reduce frequency and severity of exacerbations, and improve symptoms and the patient quality of life. Hospitalizations are the greatest contributor to the total COPD costs and account for up to 87% of total COPD related costs. Exacerbations are the main cause of admissions and subsequently account for the 40-75% of COPD costs. Inhaled maintenance therapy reduces the incidence of exacerbations in patients with stable COPD. Incorrect inhaler use and nonadherence are major obstacles to achieving COPD treatment goals. Many COPD patients have challenges (impaired inhalation, limited dexterity, reduced cognition: that limit their ability to correctly use their COPD treatment devices resulting in reduced symptom control. Of most importance is smoking cessation and early intervention with respiratory illnesses and contemplation for pulmonary rehab to enhance quality of life. \par Inhaler technique reviewed as well as oral hygiene techniques reviewed with patient. Avoidance of cold air, extremes of temperature, rescue inhaler should be used before exercise. Order of medication reviewed with patient. Recommended use of a cool mist humidifier in the bedroom. \par \par \par Problem 4: ILDz (stable) c/w RT changes \par -s/p high resolution CT scan of the chest c/w mild ILDz, follow-up CT 12/2022- next 12/2023\par -complete rheumatologic blood work- [place]\par -Hold Rx at reserve \par Etiology will depend on the causative agent possibilities include: idiopathic pulmonary fibrosis (UIP), NSIP (nonspecific interstitial pneumonia) , respiratory bronchiolitis in someone who is a smoker, drug induced lung disease, hypersensitivity pneumonitis, BOOP, sarcoidosis, chronic congestive heart failure,  rheumatologic disorder induced interstitial lung disease. Optimal diagnosing will include rheumatological panel which would include ESR, ASHELY, ANCA, ARNP, CCP, rheumatoid factor, hypersensitivity panel, JOE1, scleroderma antibodies, sjogren's syndrome antibodies; biopsy will be necessary to definitively determine the etiology unless the CT scan findings are specific for UIP. Therapy will be based on diagnostics. \par \par \par Problem 5:  Allergies/PND \par -continue Olopatadine 0.6% 1 sniff BID \par Environmental measures for allergies were encouraged including mattress and pillow covers, air purifier, and environmental controls. \par \par Problem 6: GERD\par -continue Pepcid 40mg QHS \par -Rule of 2s: avoid eating too much, eating too late, eating too spicy, eating two hours before bed.\par -Things to avoid including overeating, spicy foods, tight clothing, eating within three hours of bed, this list is not all inclusive. \par -For treatment of reflux, possible options discussed including diet control, H2 blockers, PPIs, as well as coating motility agents discussed as treatment options. Timing of meals and proximity of last meal to sleep were discussed. If symptoms persist, a formal gastrointestinal evaluation is needed. \par \par Problem 7: Chronic Respiratory failure \par -Currently on Oxygen 2L NC\par -Patient is in a stable state\par -Tests results (overnight oximetry, 6 minute walk test, exercise study, arterial blood gas, etc.) reveal that oxygen is necessary for daily life- optimally it should be used with any activity and during sleep. \par \par Problem 8: Balance issues \par -Complete balance therapy \par \par problem 9: cardiac disease? PAH\par -recommended to continue to follow up with Cardiologist (Dr. Persaud)\par \par problem 10: poor breathing mechanics\par - Recommended "10-Day Detox" by Daniel Hodges for 2-3 weeks followed by probiotics \par -Recommended Wim Hof and Buteyko breathing techniques \par -Proper breathing techniques were reviewed with an emphasis of exhalation. Patient instructed to breath in for 1 second and out for four seconds. Patient was encouraged to not talk while walking. \par \par problem 11: out of shape / overweight\par -Weight loss, exercise, and diet control were discussed and are highly encouraged. Treatment options were given such as, aqua therapy, and contacting a nutritionist. Recommended to use the elliptical, stationary bike, less use of treadmill. Mindful eating was explained to the patient Obesity is associated with worsening asthma, shortness of breath, and potential for cardiac disease, diabetes, and other underlying medical conditions\par \par problem : health maintenance \par -Antivax COVID-19\par -Recommend [employer] Knee Sleeve\par -recommended yearly flu shot after October 15\par -recommended strep pneumonia vaccines: Prevnar-13 vaccine, followed by Pneumo vaccine 23 one year following after 65\par -recommended early intervention for Upper Respiratory Infections (URIs)\par -recommended regular osteoporosis evaluations\par -recommended early dermatological evaluations\par -recommended after the age of 50 to the age of 70, colonoscopy every 5 years\par \par F/U in 6-8 weeks.\par She is encouraged to call with any changes, concerns, or questions

## 2022-12-29 NOTE — PROCEDURE
[FreeTextEntry1] : CT chest (12.19.2022) revealed Stable exam with mild bibasilar scarring in the setting of marked scoliosis and post left breast radiotherapy changes to the lingula.\par Enlarged pulmonary artery which can be seen with pulmonary hypertension.\par Stable 3 cm hypoattenuating right thyroid nodule since December 2021. Routine thyroid ultrasound can be performed for further characterization if warranted.\par 4.6 cm mid ascending aorta.\par \par Full PFT reveals mild restrictive dysfunction ; FEV1 was 1.25 L which is 72% of predicted; normal lung volumes; moderately reduced diffusion at 10.7, which is 58% of predicted; normal flow volume loop. \par \par FENO was 22; a normal value being less than 25\par Fractional exhaled nitric oxide (FENO) is regarded as a simple, noninvasive method for assessing eosinophilic airway inflammation. Produced by a variety of cells within the lung, nitric oxide (NO) concentrations are generally low in healthy individuals. However, high concentrations of NO appear to be involved in nonspecific host defense mechanisms and chronic inflammatory diseases such as asthma. The American Thoracic Society (ATS) therefore has recommended using FENO to aid in the diagnosis and monitoring of eosinophilic airway inflammation and asthma, and for identifying steroid responsive individuals whose chronic respiratory symptoms may be caused by airway inflammation.

## 2022-12-29 NOTE — HISTORY OF PRESENT ILLNESS
[TextBox_4] : Ms. YEUNG is a 77 year old female with a history of left sided breast cancer, s/p lumpectomy and radiation theray (2008), uterine cancer, s/p ELIDA, diverticulitis, kyphoscoliosis, hidradenitis supertavia, thyroid nodules, 40 pack year smoker, bronchitis, vaginal cancer (2013) s/p radiation, blood clots in both lungs (PE), history of SVT, arthritis  presenting to the office today for a follow-up pulmonary evaluation. Her chief complaint is\par \par -she notes condition is poor \par -she notes progressive fatigue\par -she notes progressive dyspnea with exertion especially on stairs onset couple months \par -she notes s/p blood work with hematologist which revealed malignancy and now awaiting PET CT \par -she notes persistent LE edema that was exacerbated due to venous leg ulcer\par -she notes s/p fall 12/24/2022 with subsequent left sided ecchymosis and knee pain \par -she denies syncope prior to fall\par -she notes constipation \par -she notes appetite is stable \par -she notes slight gain in weight \par -she notes good quality of sleep \par -she notes getting about 6-8 hrs of sleep\par \par -she denies any headaches, nausea, vomiting, fever, chills, sweats, chest pain, chest pressure, coughing, wheezing, palpitations, diarrhea, dizziness, dysphagia, heartburn, reflux, itchy eyes, itchy ears, or sour taste in the mouth.

## 2022-12-30 PROBLEM — J44.9 CHRONIC OBSTRUCTIVE PULMONARY DISEASE, UNSPECIFIED: Chronic | Status: ACTIVE | Noted: 2022-12-25

## 2022-12-30 PROBLEM — I26.99 OTHER PULMONARY EMBOLISM WITHOUT ACUTE COR PULMONALE: Chronic | Status: ACTIVE | Noted: 2022-12-25

## 2023-01-12 ENCOUNTER — APPOINTMENT (OUTPATIENT)
Dept: GYNECOLOGIC ONCOLOGY | Facility: CLINIC | Age: 78
End: 2023-01-12
Payer: MEDICARE

## 2023-01-12 VITALS
WEIGHT: 155 LBS | SYSTOLIC BLOOD PRESSURE: 138 MMHG | HEIGHT: 61 IN | BODY MASS INDEX: 29.27 KG/M2 | HEART RATE: 86 BPM | DIASTOLIC BLOOD PRESSURE: 76 MMHG

## 2023-01-12 PROCEDURE — 99213 OFFICE O/P EST LOW 20 MIN: CPT

## 2023-01-12 RX ORDER — FAMOTIDINE 40 MG/1
40 TABLET, FILM COATED ORAL
Qty: 90 | Refills: 1 | Status: DISCONTINUED | COMMUNITY
Start: 2021-12-02 | End: 2023-01-12

## 2023-01-12 RX ORDER — FUROSEMIDE 20 MG/1
20 TABLET ORAL
Qty: 30 | Refills: 0 | Status: DISCONTINUED | COMMUNITY
Start: 2022-02-04 | End: 2023-01-12

## 2023-01-12 RX ORDER — OLOPATADINE HYDROCHLORIDE 665 UG/1
0.6 SPRAY, METERED NASAL
Qty: 3 | Refills: 1 | Status: DISCONTINUED | COMMUNITY
Start: 2021-12-02 | End: 2023-01-12

## 2023-01-12 NOTE — HISTORY OF PRESENT ILLNESS
[FreeTextEntry1] : Ms. Neely has recurrent endometrial cancer. She underwent ELIDA/BSO in 2004 for FIGO grade 1 endometrioid adenocarcinoma, myoinvasive <50% (4/11mm) with negative cytology and no staging performed. She did not have any adjuvant therapy and did not follow up consistently with a gyn oncologist until February 2013 when she was diagnosed with a small vaginal apex recurrence by Dr. Cristian Alvarez. PET/CT revealed no evidence of metastatic disease, and she was therefore treated with radiation therapy.\par \par She was treated by Dr. Fry with EBRT 4500 cGy followed by brachytherapy completed 5/13/13. She tolerated treatment very well without significant toxicity. \par \par She is overall feeling very well and denies cancer-associated signs and symptoms. \par She had bilateral PEs of unknown etiology (per patient a PET was negative and hematology workup negative) and she is following with Dr. Prado and is on continuous O2 via NC now. \par She is having regular BMs and denies urinary complaints other than incontinence which is chronic. \par She has used her dilator intermittently.\par She denies any VB or abnormal discharge.\par She had some chronic BRBPR evaluated by her GI, it is attributed to constipation/hemorrhoids and has improved with use of stool softeners.\par \par Incidentally, she also has a history of breast cancer which was diagnosed in 2008 and treated with left breast lumpectomy, radiation and chemotherapy. She was followed by Dr. Erickson Casanova at [facility] [place], but currently has her breast exams with her medical oncologist. Dr. Boss. Recent  was elevated, so she has a PET/CT scheduled for 1/19/23 at [facility]. CHest CT by Dr. Prado 12/2022.\par \par PAPs: \par 1/2019 vaginal biopsy benign\par 10/13/16 PAP negative\par 3/10/16: PAP negative; vaginal biopsy- granulation tissue\par 10/2015: she reports that Dr. Fry did a PAP and it was normal (we have no report)\par 2/2015 negative\par 10/22/14 insufficient due to scant cellularity; \par 1/15/14 negative; vaginal biopsy 5/2014 benign\par \par Mammo/sono:  breast health per medical oncology\par Colonoscopy: 2014-15 negative per pt ; Dr. BRYSON Pires; diverticulosis; \par \par GI: Dr. BRYSON Pires\par PCP: Dr. Cintron\par Med onc: Dr. Eleuterio Gusman - ofelia in same office\par Endocrinologist (thyroid nodule): Dr. Funmi Boles\par Urologist: Dr. Brown\par Referring GYN: Dr. Cristian Alvarez

## 2023-01-12 NOTE — PHYSICAL EXAM
[Chaperone Present] : A chaperone was present in the examining room during all aspects of the physical examination [Absent] : Adnexa(ae): Absent [Normal] : Recto-Vaginal Exam: Normal [de-identified] : well-healed vertical hysterectomy scar [de-identified] : mild stenosis at apex moderate atrophy; disrupted adhesions at apex bleed with speculum insertion but no lesions [de-identified] : no mass or tenderness

## 2023-01-17 ENCOUNTER — RX RENEWAL (OUTPATIENT)
Age: 78
End: 2023-01-17

## 2023-05-12 ENCOUNTER — APPOINTMENT (OUTPATIENT)
Dept: PULMONOLOGY | Facility: CLINIC | Age: 78
End: 2023-05-12
Payer: MEDICARE

## 2023-05-12 VITALS
HEART RATE: 75 BPM | WEIGHT: 155 LBS | RESPIRATION RATE: 16 BRPM | HEIGHT: 61 IN | BODY MASS INDEX: 29.27 KG/M2 | SYSTOLIC BLOOD PRESSURE: 128 MMHG | TEMPERATURE: 97.2 F | DIASTOLIC BLOOD PRESSURE: 70 MMHG | OXYGEN SATURATION: 90 %

## 2023-05-12 DIAGNOSIS — M41.9 SCOLIOSIS, UNSPECIFIED: ICD-10-CM

## 2023-05-12 PROCEDURE — 94729 DIFFUSING CAPACITY: CPT

## 2023-05-12 PROCEDURE — 94727 GAS DIL/WSHOT DETER LNG VOL: CPT

## 2023-05-12 PROCEDURE — ZZZZZ: CPT

## 2023-05-12 PROCEDURE — 94010 BREATHING CAPACITY TEST: CPT

## 2023-05-12 PROCEDURE — 95012 NITRIC OXIDE EXP GAS DETER: CPT

## 2023-05-12 PROCEDURE — 99214 OFFICE O/P EST MOD 30 MIN: CPT | Mod: 25

## 2023-05-12 NOTE — ASSESSMENT
[FreeTextEntry1] : Ms. YEUNG is a 78 year old female with a history of left sided breast cancer, former 40+ pack smoker, s/p lumpectomy and radiation theray (2008), uterine cancer, s/p ELIDA, diverticulitis, kyphoscoliosis, hidradenitis supertavia, thyroid nodules, 40 pack year smoker, bronchitis, vaginal cancer (2013) s/p radiation, s/p SVT, blood clots in both lungs (PE), arthritis, and balance issues  presenting to the office today for a follow-up pulmonary evaluation. Her chief complaint is sob, restrictive dysfunction, COPD, pulmonary emboli, allergies, gerd - mild ILDz due ti RT- stable and improved- ?PAH; stable except balance\par \par \par Her shortness of breath is multifactorial due to:\par -poor mechanics of breathing \par -out of shape / overweight\par -pulmonary disease\par    -restrictive / obstructive dysfunction/ ?PAH\par -cardiac disease (Dr. Persaud ([facility]))\par \par Problem 1: restrictive dysfunction (kyphoscoliosis)\par -s/p CBC \par -s/p iron studies \par - "positive" weight loss\par \par Problem 1A: Anemia - (hgb 12.2)\par - f/p with hematologist \par \par Problem 2: History of pulmonary emboli- ?PAH\par -complete Cardiac BNP q3 months\par -Complete yearly ECHO cardiogram ([place])- 7/2023\par -continue Xarelto 15 mg \par Blood clots are noted w/in your lungs diagnosed by either abnormal CTPA or ventilation perfusion scan. You will need a hypercoagulable work up done by a hematologist to attempt to identify the etiology of this problem. Anticoagulation will be needed for at least 6 months- drugs included are Coumadin, lovenox, arixtra, or another agent. Repeat CTPA or VQ scan will be needed in approximately 6-8 weeks. An echocardiogram may be needed to assess for right ventricular function and pulmonary hypertension. The importance of hydration, ambulation, and regular/consistent diet are extremely important. \par -Pulmonary hypertension is a disorder of the pulmonary arteries. It is important to distinguish the difference between pulmonary arterial hypertension which is idiopathic versus secondary pulmonary hypertension which is related to heart disease being diastolic dysfunction or congestive heart failure or other forms of pulmonary hypertension. Diagnostics include an initial echocardiogram evaluating the pulmonary artery pressures. If this is abnormal, to proceed with a VQ scan as well as a CTPA, and an eventual right heart catheterization to absolutely confirm the echocardiogram findings. (No medication can be prescribed until the right heart catheterization). If present, the evaluation will include rheumatological blood testing, HIV testing, and potential evaluation for cirrhosis. Drug classes include: PED5 (Revatio, Adcirca); ETRA (Tracleer, Macitentan, Letairis); Soluble guanylate cyclase (Adempas); Prostacyclins (Uptravi, Tyavso, Ventavis, Remodulin, or Orenitram derivatives). \par \par Problem 3: COPD \par -continue Anoro 1 inhalation QD \par COPD is a progressive disease and although it can’t be cured , appropriate management can slow its progression, reduce frequency and severity of exacerbations, and improve symptoms and the patient quality of life. Hospitalizations are the greatest contributor to the total COPD costs and account for up to 87% of total COPD related costs. Exacerbations are the main cause of admissions and subsequently account for the 40-75% of COPD costs. Inhaled maintenance therapy reduces the incidence of exacerbations in patients with stable COPD. Incorrect inhaler use and nonadherence are major obstacles to achieving COPD treatment goals. Many COPD patients have challenges (impaired inhalation, limited dexterity, reduced cognition: that limit their ability to correctly use their COPD treatment devices resulting in reduced symptom control. Of most importance is smoking cessation and early intervention with respiratory illnesses and contemplation for pulmonary rehab to enhance quality of life. \par Inhaler technique reviewed as well as oral hygiene techniques reviewed with patient. Avoidance of cold air, extremes of temperature, rescue inhaler should be used before exercise. Order of medication reviewed with patient. Recommended use of a cool mist humidifier in the bedroom. \par \par \par Problem 4: ILDz (stable) c/w RT changes \par -s/p high resolution CT scan of the chest c/w mild ILDz, follow-up CT 12/2022- next 12/2023\par -complete rheumatologic blood work- [place]\par -Hold Rx at reserve \par Etiology will depend on the causative agent possibilities include: idiopathic pulmonary fibrosis (UIP), NSIP (nonspecific interstitial pneumonia) , respiratory bronchiolitis in someone who is a smoker, drug induced lung disease, hypersensitivity pneumonitis, BOOP, sarcoidosis, chronic congestive heart failure,  rheumatologic disorder induced interstitial lung disease. Optimal diagnosing will include rheumatological panel which would include ESR, ASHELY, ANCA, ARNP, CCP, rheumatoid factor, hypersensitivity panel, JOE1, scleroderma antibodies, sjogren's syndrome antibodies; biopsy will be necessary to definitively determine the etiology unless the CT scan findings are specific for UIP. Therapy will be based on diagnostics. \par \par \par Problem 5:  Allergies/PND \par -continue Olopatadine 0.6% 1 sniff BID \par Environmental measures for allergies were encouraged including mattress and pillow covers, air purifier, and environmental controls. \par \par Problem 6: GERD\par -continue Pepcid 40mg QHS \par -Rule of 2s: avoid eating too much, eating too late, eating too spicy, eating two hours before bed.\par -Things to avoid including overeating, spicy foods, tight clothing, eating within three hours of bed, this list is not all inclusive. \par -For treatment of reflux, possible options discussed including diet control, H2 blockers, PPIs, as well as coating motility agents discussed as treatment options. Timing of meals and proximity of last meal to sleep were discussed. If symptoms persist, a formal gastrointestinal evaluation is needed. \par \par Problem 7: Chronic Respiratory failure \par -Currently on Oxygen 2L NC\par -travel 4L NC\par -Patient is in a stable state\par -Tests results (overnight oximetry, 6 minute walk test, exercise study, arterial blood gas, etc.) reveal that oxygen is necessary for daily life- optimally it should be used with any activity and during sleep. \par \par Problem 8: Balance issues \par -Complete balance therapy-rescripted\par \par problem 9: cardiac disease? PAH\par -recommended to continue to follow up with Cardiologist (Dr. Persaud)\par \par problem 10: poor breathing mechanics\par - Recommended "10-Day Detox" by Daniel Hodges for 2-3 weeks followed by probiotics \par -Recommended Wim Hof and Buteyko breathing techniques \par -Proper breathing techniques were reviewed with an emphasis of exhalation. Patient instructed to breath in for 1 second and out for four seconds. Patient was encouraged to not talk while walking. \par \par problem 11: out of shape / overweight\par -Weight loss, exercise, and diet control were discussed and are highly encouraged. Treatment options were given such as, aqua therapy, and contacting a nutritionist. Recommended to use the elliptical, stationary bike, less use of treadmill. Mindful eating was explained to the patient Obesity is associated with worsening asthma, shortness of breath, and potential for cardiac disease, diabetes, and other underlying medical conditions\par \par problem : health maintenance \par -Antivax COVID-19\par -Recommend [employer] Knee Sleeve\par -recommended yearly flu shot after October 15\par -recommended strep pneumonia vaccines: Prevnar-13 vaccine, followed by Pneumo vaccine 23 one year following after 65\par -recommended early intervention for Upper Respiratory Infections (URIs)\par -recommended regular osteoporosis evaluations\par -recommended early dermatological evaluations\par -recommended after the age of 50 to the age of 70, colonoscopy every 5 years\par \par F/U in 3-4 months\par She is encouraged to call with any changes, concerns, or questions

## 2023-05-12 NOTE — PROCEDURE
[FreeTextEntry1] : Full PFT reveals mild-moderate restrictive dysfunction; FEV1 was  1.13L which is 66% of predicted; normal lung volumes; mild diffusion at 11.7, which is 67% of predicted; poor inspiratory limb\par PFTs were performed to evaluate for SOB\par \par FENO was 26 ; a normal value being less than 25\par Fractional exhaled nitric oxide (FENO) is regarded as a simple, noninvasive method for assessing eosinophilic airway inflammation. Produced by a variety of cells within the lung, nitric oxide (NO) concentrations are generally low in healthy individuals. However, high concentrations of NO appear to be involved in nonspecific host defense mechanisms and chronic inflammatory diseases such as asthma. The American Thoracic Society (ATS) therefore has recommended using FENO to aid in the diagnosis and monitoring of eosinophilic airway inflammation and asthma, and for identifying steroid responsive individuals whose chronic respiratory symptoms may be caused by airway inflammation.\par \par

## 2023-05-12 NOTE — HISTORY OF PRESENT ILLNESS
[TextBox_4] : Ms. YEUNG is a 78year old female with a history of left sided breast cancer, s/p lumpectomy and radiation theray (2008), uterine cancer, s/p ELIDA, diverticulitis, kyphoscoliosis, hidradenitis supertavia, thyroid nodules, 40 pack year smoker, bronchitis, vaginal cancer (2013) s/p radiation, blood clots in both lungs (PE), history of SVT, arthritis  presenting to the office today for a follow-up pulmonary evaluation. Her chief complaint is\par \par -she notes feeling generally well\par -she notes on 2L of oxygen\par -she notes pending air travel to [place]\par -she notes intermittent constipation accompanied with vomiting\par -she notes interrupted sleep due to nocturia\par -she notes appetite is stable\par -she denies dysphonia\par -she notes adequate sleep duration\par -she notes knee arthralgias\par \par -she denies any headaches, nausea, fever, chills, sweats, chest pain, chest pressure, coughing, wheezing, palpitations, diarrhea, vertigo, dysphagia, heartburn, reflux, itchy eyes, itchy ears, leg swelling, myalgias, or sour taste in the mouth.\par

## 2023-05-12 NOTE — PHYSICAL EXAM
[No Acute Distress] : no acute distress [Normal Oropharynx] : normal oropharynx [III] : Mallampati Class: III [Normal Appearance] : normal appearance [No Neck Mass] : no neck mass [Normal Rate/Rhythm] : normal rate/rhythm [Normal S1, S2] : normal s1, s2 [No Murmurs] : no murmurs [No Resp Distress] : no resp distress [Clear to Auscultation Bilaterally] : clear to auscultation bilaterally [Kyphosis] : kyphosis [Benign] : benign [Normal Gait] : normal gait [No Clubbing] : no clubbing [No Cyanosis] : no cyanosis [FROM] : FROM [Normal Color/ Pigmentation] : normal color/ pigmentation [No Focal Deficits] : no focal deficits [Oriented x3] : oriented x3 [Normal Affect] : normal affect [TextBox_2] : ecchymosis on left face  [TextBox_54] : 2/6 sys murmur  [TextBox_68] : I:E ratio 1:3; clear [TextBox_105] : 1+ LE edema

## 2023-05-12 NOTE — ADDENDUM
[FreeTextEntry1] : Documented by LINA Schwartz acting as a scribe for Dr. Lucas Prado on 05/12/2023 .\par \par All medical record entries made by the Scribe were at my, Dr. Lucas Prado's, direction and personally dictated by me on 05/12/2023. I have reviewed the chart and agree that the record accurately reflects my personal performance of the history, physical exam, assessment and plan. I have also personally directed, reviewed, and agree with the discharge instructions.\par

## 2023-05-15 ENCOUNTER — RX RENEWAL (OUTPATIENT)
Age: 78
End: 2023-05-15

## 2023-06-06 ENCOUNTER — APPOINTMENT (OUTPATIENT)
Dept: CT IMAGING | Facility: CLINIC | Age: 78
End: 2023-06-06
Payer: MEDICARE

## 2023-06-06 ENCOUNTER — OUTPATIENT (OUTPATIENT)
Dept: OUTPATIENT SERVICES | Facility: HOSPITAL | Age: 78
LOS: 1 days | End: 2023-06-06
Payer: MEDICARE

## 2023-06-06 DIAGNOSIS — Z90.710 ACQUIRED ABSENCE OF BOTH CERVIX AND UTERUS: Chronic | ICD-10-CM

## 2023-06-06 DIAGNOSIS — Z98.890 OTHER SPECIFIED POSTPROCEDURAL STATES: Chronic | ICD-10-CM

## 2023-06-06 DIAGNOSIS — K92.2 GASTROINTESTINAL HEMORRHAGE, UNSPECIFIED: ICD-10-CM

## 2023-06-06 PROCEDURE — 74261 CT COLONOGRAPHY DX: CPT | Mod: 26

## 2023-06-06 PROCEDURE — 74261 CT COLONOGRAPHY DX: CPT

## 2023-07-13 ENCOUNTER — NON-APPOINTMENT (OUTPATIENT)
Age: 78
End: 2023-07-13

## 2023-07-13 ENCOUNTER — APPOINTMENT (OUTPATIENT)
Dept: GYNECOLOGIC ONCOLOGY | Facility: CLINIC | Age: 78
End: 2023-07-13

## 2023-07-13 NOTE — LETTER BODY
[FreeTextEntry2] : She is clinically without evidence of disease at this time. \par I advised her to see me in 6 months for continued surveillance, and to see you annually in the interim. Thank you again for referring this sole patient.

## 2023-07-13 NOTE — PHYSICAL EXAM
[de-identified] : well-healed vertical hysterectomy scar [de-identified] : mild stenosis at apex moderate atrophy; disrupted adhesions at apex bleed with speculum insertion but no lesions [de-identified] : no mass or tenderness

## 2023-07-13 NOTE — HISTORY OF PRESENT ILLNESS
[FreeTextEntry1] : Ms. Neely has recurrent endometrial cancer. She underwent ELIDA/BSO in 2004 for FIGO grade 1 endometrioid adenocarcinoma, myoinvasive <50% (4/11mm) with negative cytology and no staging performed. She did not have any adjuvant therapy and did not follow up consistently with a gyn oncologist until February 2013 when she was diagnosed with a small vaginal apex recurrence by Dr. Cristian Alvarez. PET/CT revealed no evidence of metastatic disease, and she was therefore treated with radiation therapy.\par \par She was treated by Dr. Fry with EBRT 4500 cGy followed by brachytherapy completed 5/13/13. She tolerated treatment very well without significant toxicity. \par \par She is overall feeling very well and denies cancer-associated signs and symptoms. \par She had bilateral PEs of unknown etiology (per patient a PET was negative and hematology workup negative) and she is following with Dr. Prado and is on continuous O2 via NC now. \par She is having regular BMs and denies urinary complaints other than incontinence which is chronic. \par She has used her dilator intermittently.\par She denies any VB or abnormal discharge.\par She had some chronic BRBPR evaluated by her GI, it is attributed to constipation/hemorrhoids and has improved with use of stool softeners.\par \par Incidentally, she also has a history of breast cancer which was diagnosed in 2008 and treated with left breast lumpectomy, radiation and chemotherapy. She was followed by Dr. Erickson Casanova at [facility], but currently has her breast exams with her medical oncologist. Dr. Boss. Recent  was elevated, so she has a PET/CT scheduled for 1/19/23 at [facility]. CHest CT by Dr. Prado 12/2022.  Of note, she has been oxygen dependent since 2022.  She is on Xarelto for bilateral DVT and PEs.\par \par  she will have PET/CT at [facility] 1/19/23 and send us the report ??\par \par 6/2023- Tumor Markers- - 49.7, - 7.5, CEA- 3.1\par \par PAPs: \par 1/2019 vaginal biopsy benign\par 10/13/16 PAP negative\par 3/10/16: PAP negative; vaginal biopsy- granulation tissue\par 10/2015: she reports that Dr. Fry did a PAP and it was normal (we have no report)\par 2/2015 negative\par 10/22/14 insufficient due to scant cellularity; \par 1/15/14 negative; vaginal biopsy 5/2014 benign\par \par Mammo/sono:  breast health per medical oncology\par Colonoscopy: 2014-15 negative per pt ; Dr. BRYSON Pires; diverticulosis; \par \par GI: Dr. BRYSON Pires\par PCP: Dr. Cintron\par Med onc: Dr. Eleuterio Gusman - continues in same office\par Endocrinologist (thyroid nodule): Dr. Funmi Boles\par Urologist: Dr. Brown\par Referring GYN: Dr. Crisitan Alvarez

## 2023-08-29 ENCOUNTER — RX RENEWAL (OUTPATIENT)
Age: 78
End: 2023-08-29

## 2023-09-07 ENCOUNTER — APPOINTMENT (OUTPATIENT)
Dept: GYNECOLOGIC ONCOLOGY | Facility: CLINIC | Age: 78
End: 2023-09-07
Payer: MEDICARE

## 2023-09-07 VITALS
WEIGHT: 166 LBS | DIASTOLIC BLOOD PRESSURE: 86 MMHG | HEIGHT: 61 IN | SYSTOLIC BLOOD PRESSURE: 128 MMHG | BODY MASS INDEX: 31.34 KG/M2 | HEART RATE: 79 BPM

## 2023-09-07 PROCEDURE — 99213 OFFICE O/P EST LOW 20 MIN: CPT

## 2023-09-28 ENCOUNTER — APPOINTMENT (OUTPATIENT)
Dept: PULMONOLOGY | Facility: CLINIC | Age: 78
End: 2023-09-28
Payer: MEDICARE

## 2023-09-28 VITALS
RESPIRATION RATE: 16 BRPM | SYSTOLIC BLOOD PRESSURE: 128 MMHG | BODY MASS INDEX: 31.34 KG/M2 | HEIGHT: 61 IN | DIASTOLIC BLOOD PRESSURE: 68 MMHG | WEIGHT: 166 LBS | TEMPERATURE: 97.6 F | HEART RATE: 70 BPM | OXYGEN SATURATION: 90 %

## 2023-09-28 PROCEDURE — 99214 OFFICE O/P EST MOD 30 MIN: CPT | Mod: 25

## 2023-09-28 PROCEDURE — 94010 BREATHING CAPACITY TEST: CPT

## 2023-09-28 PROCEDURE — 94727 GAS DIL/WSHOT DETER LNG VOL: CPT

## 2023-09-28 PROCEDURE — 95012 NITRIC OXIDE EXP GAS DETER: CPT

## 2023-09-28 PROCEDURE — 94729 DIFFUSING CAPACITY: CPT

## 2023-10-24 ENCOUNTER — APPOINTMENT (OUTPATIENT)
Dept: CT IMAGING | Facility: IMAGING CENTER | Age: 78
End: 2023-10-24
Payer: MEDICARE

## 2023-10-24 ENCOUNTER — OUTPATIENT (OUTPATIENT)
Dept: OUTPATIENT SERVICES | Facility: HOSPITAL | Age: 78
LOS: 1 days | End: 2023-10-24
Payer: MEDICARE

## 2023-10-24 DIAGNOSIS — Z90.710 ACQUIRED ABSENCE OF BOTH CERVIX AND UTERUS: Chronic | ICD-10-CM

## 2023-10-24 DIAGNOSIS — Z98.890 OTHER SPECIFIED POSTPROCEDURAL STATES: Chronic | ICD-10-CM

## 2023-10-24 DIAGNOSIS — Z00.8 ENCOUNTER FOR OTHER GENERAL EXAMINATION: ICD-10-CM

## 2023-10-24 DIAGNOSIS — R93.89 ABNORMAL FINDINGS ON DIAGNOSTIC IMAGING OF OTHER SPECIFIED BODY STRUCTURES: ICD-10-CM

## 2023-10-24 PROCEDURE — 71250 CT THORAX DX C-: CPT

## 2023-10-24 PROCEDURE — 71250 CT THORAX DX C-: CPT | Mod: 26

## 2024-02-07 ENCOUNTER — APPOINTMENT (OUTPATIENT)
Dept: PULMONOLOGY | Facility: CLINIC | Age: 79
End: 2024-02-07
Payer: MEDICARE

## 2024-02-07 VITALS
HEIGHT: 61 IN | HEART RATE: 77 BPM | OXYGEN SATURATION: 88 % | WEIGHT: 166 LBS | BODY MASS INDEX: 31.34 KG/M2 | RESPIRATION RATE: 16 BRPM | DIASTOLIC BLOOD PRESSURE: 60 MMHG | SYSTOLIC BLOOD PRESSURE: 114 MMHG | TEMPERATURE: 97.1 F

## 2024-02-07 DIAGNOSIS — R26.89 OTHER ABNORMALITIES OF GAIT AND MOBILITY: ICD-10-CM

## 2024-02-07 DIAGNOSIS — R09.82 POSTNASAL DRIP: ICD-10-CM

## 2024-02-07 PROCEDURE — 95012 NITRIC OXIDE EXP GAS DETER: CPT

## 2024-02-07 PROCEDURE — 94010 BREATHING CAPACITY TEST: CPT

## 2024-02-07 PROCEDURE — 99214 OFFICE O/P EST MOD 30 MIN: CPT | Mod: 25

## 2024-02-07 NOTE — ASSESSMENT
[FreeTextEntry1] : Ms. YEUNG is a 78 year old female with a history of left sided breast cancer, former 40+ pack smoker, s/p lumpectomy and radiation therapy (2008), uterine cancer, s/p ELIDA, diverticulitis, kyphoscoliosis, hidradenitis supertavia, thyroid nodules, 40 pack year smoker, bronchitis, vaginal cancer (2013) s/p radiation, s/p SVT, blood clots in both lungs (PE), arthritis, and balance issues presenting to the office today for a follow-up pulmonary evaluation. 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 Persaud ([facility]))  Problem 1: restrictive dysfunction (kyphoscoliosis) -s/p CBC -s/p iron studies - "positive" weight loss  Problem 1A: Anemia - (hgb 12.2) - f/p with hematologist  Problem 2: History of pulmonary emboli- NO PAH -complete Cardiac BNP q3 months -Complete yearly ECHO cardiogram ([place])- 7/2023, 1/2024 -continue Xarelto 15 mg Blood clots are noted w/in your lungs diagnosed by either abnormal CTPA or ventilation perfusion scan. You will need a hypercoagulable work up done by a hematologist to attempt to identify the etiology of this problem. Anticoagulation will be needed for at least 6 months- drugs included are Coumadin, lovenox, arixtra, or another agent. Repeat CTPA or VQ scan will be needed in approximately 6-8 weeks. An echocardiogram may be needed to assess for right ventricular function and pulmonary hypertension. The importance of hydration, ambulation, and regular/consistent diet are extremely important. -Pulmonary hypertension is a disorder of the pulmonary arteries. It is important to distinguish the difference between pulmonary arterial hypertension which is idiopathic versus secondary pulmonary hypertension which is related to heart disease being diastolic dysfunction or congestive heart failure or other forms of pulmonary hypertension. Diagnostics include an initial echocardiogram evaluating the pulmonary artery pressures. If this is abnormal, to proceed with a VQ scan as well as a CTPA, and an eventual right heart catheterization to absolutely confirm the echocardiogram findings. (No medication can be prescribed until the right heart catheterization). If present, the evaluation will include rheumatological blood testing, HIV testing, and potential evaluation for cirrhosis. Drug classes include: PED5 (Revatio, Adcirca); ETRA (Tracleer, Macitentan, Letairis); Soluble guanylate cyclase (Adempas); Prostacyclins (Uptravi, Tyavso, Ventavis, Remodulin, or Orenitram derivatives).  Problem 3: COPD -continue Anoro 1 inhalation QD COPD is a progressive disease and although it can't be cured , appropriate management can slow its progression, reduce frequency and severity of exacerbations, and improve symptoms and the patient quality of life. Hospitalizations are the greatest contributor to the total COPD costs and account for up to 87% of total COPD related costs. Exacerbations are the main cause of admissions and subsequently account for the 40-75% of COPD costs. Inhaled maintenance therapy reduces the incidence of exacerbations in patients with stable COPD. Incorrect inhaler use and nonadherence are major obstacles to achieving COPD treatment goals. Many COPD patients have challenges (impaired inhalation, limited dexterity, reduced cognition: that limit their ability to correctly use their COPD treatment devices resulting in reduced symptom control. Of most importance is smoking cessation and early intervention with respiratory illnesses and contemplation for pulmonary rehab to enhance quality of life. Inhaler technique reviewed as well as oral hygiene techniques reviewed with patient. Avoidance of cold air, extremes of temperature, rescue inhaler should be used before exercise. Order of medication reviewed with patient. Recommended use of a cool mist humidifier in the bedroom.   Problem 4: ILDz (stable) c/w RT changes (Stable) -s/p high resolution CT scan of the chest c/w mild ILDz, follow-up CT 10/2024 -complete rheumatologic blood work- NC -Hold Rx at reserve Etiology will depend on the causative agent possibilities include: idiopathic pulmonary fibrosis (UIP), NSIP (nonspecific interstitial pneumonia) , respiratory bronchiolitis in someone who is a smoker, drug induced lung disease, hypersensitivity pneumonitis, BOOP, sarcoidosis, chronic congestive heart failure, rheumatologic disorder induced interstitial lung disease. Optimal diagnosing will include rheumatological panel which would include ESR, ASHELY, ANCA, ARNP, CCP, rheumatoid factor, hypersensitivity panel, JOE1, scleroderma antibodies, sjogren's syndrome antibodies; biopsy will be necessary to definitively determine the etiology unless the CT scan findings are specific for UIP. Therapy will be based on diagnostics.   Problem 5: Allergies/PND -continue Olopatadine 0.6% 1 sniff BID Environmental measures for allergies were encouraged including mattress and pillow covers, air purifier, and environmental controls.  Problem 6: GERD -continue Pepcid 40mg QHS -Rule of 2s: avoid eating too much, eating too late, eating too spicy, eating two hours before bed. -Things to avoid including overeating, spicy foods, tight clothing, eating within three hours of bed, this list is not all inclusive. -For treatment of reflux, possible options discussed including diet control, H2 blockers, PPIs, as well as coating motility agents discussed as treatment options. Timing of meals and proximity of last meal to sleep were discussed. If symptoms persist, a formal gastrointestinal evaluation is needed.  Problem 7: Chronic Respiratory failure -Currently on Oxygen 2L NC -travel 4L NC -Patient is in a stable state -Tests results (overnight oximetry, 6 minute walk test, exercise study, arterial blood gas, etc.) reveal that oxygen is necessary for daily life- optimally it should be used with any activity and during sleep.  Problem 7A: Restrictive Dysfunction -Reassess exercise limitation caused by breathlessness and fatigue and also provide a supportive environment in which patients can become active and engage in management of their health problems -Recommended to get The Breather and Power Breath Medic Pro Plus IMT (30 repetitions BID for respiratory muscle strength weakness)  Problem 8: Balance issues -Complete balance therapy-rescripted  problem 9: cardiac disease? PAH -recommended to continue to follow up with Cardiologist (Dr. Persaud)  problem 10: poor breathing mechanics - Recommended "10-Day Detox" by Daniel Hodges for 2-3 weeks followed by probiotics -Recommended Bertram Abdi and Butterri breathing techniques -Proper breathing techniques were reviewed with an emphasis of exhalation. Patient instructed to breath in for 1 second and out for four seconds. Patient was encouraged to not talk while walking.  problem 11: out of shape / overweight -Weight loss, exercise, and diet control were discussed and are highly encouraged. Treatment options were given such as, aqua therapy, and contacting a nutritionist. Recommended to use the elliptical, stationary bike, less use of treadmill. Mindful eating was explained to the patient Obesity is associated with worsening asthma, shortness of breath, and potential for cardiac disease, diabetes, and other underlying medical conditions  problem : health maintenance -recommended RSV vaccine in the Fall for anyone over the age of 60 -Antivax COVID-19 -Recommend [employer] Knee Sleeve -recommended yearly flu shot 2023 (refused)  -recommended strep pneumonia vaccines: Prevnar-13 vaccine, followed by Pneumo vaccine 23 one year following after 65 -recommended early intervention for Upper Respiratory Infections (URIs) -recommended regular osteoporosis evaluations -recommended early dermatological evaluations -recommended after the age of 50 to the age of 70, colonoscopy every 5 years  F/U in 3-4 months She is encouraged to call with any changes, concerns, or questions.

## 2024-02-07 NOTE — HISTORY OF PRESENT ILLNESS
[TextBox_4] : Ms. YEUNG is a 78 year old female with a history of left sided breast cancer, s/p lumpectomy and radiation therapy (2008), uterine cancer, s/p ELIDA, diverticulitis, kyphoscoliosis, hidradenitis supertavia, thyroid nodules, 40 pack year smoker, bronchitis, vaginal cancer (2013) s/p radiation, blood clots in both lungs (PE), history of SVT, arthritis presenting to the office today for a follow-up pulmonary evaluation. Her chief complaint is  - she notes in general she has been slowing down due to the cold weather and her age - she notes having constipation - vision is stable - sense of taste and smell are good - she notes occasional hoarseness - she notes having the flu about a month ago which dropped into her chest only for a day or two - she notes bowels are regular - she notes having a cough from the flu which took her 3 weeks to get rid of  - she notes her cough got better with Robitussin - she notes eating well  - she notes her oxygen levels do not last as long as they did 6 months ago when she takes her oxygen off  - she notes no new medications, vitamins, or supplements - she notes taking NAC - she notes seeing Dr. Persaud who said her heart was in good condition - she notes not wanting to take a flu shot after getting the flu   -she denies any headaches, nausea, emesis, fever, chills, sweats, chest pain, chest pressure, wheezing, palpitations, diarrhea, vertigo, dysphagia, heartburn, reflux, itchy eyes, itchy ears, leg swelling, leg pain, arthralgias, myalgias, hoarseness, or sour taste in the mouth.

## 2024-02-07 NOTE — PROCEDURE
[FreeTextEntry1] : PFT reveals severe restrictive dysfunction, with an FEV1 of 0.67 L, which is 40.3% of predicted, with a normal flow volume loop. PFTs were performed to evaluate for COPD  FENO was 76; a normal value being less than 25 Fractional exhaled nitric oxide (FENO) is regarded as a simple, noninvasive method for assessing eosinophilic airway inflammation. Produced by a variety of cells within the lung, nitric oxide (NO) concentrations are generally low in healthy individuals. However, high concentrations of NO appear to be involved in nonspecific host defense mechanisms and chronic inflammatory diseases such as asthma. The American Thoracic Society (ATS) therefore has strongly recommended using FENO to aid in the assessment, management, and long-term monitoring of eosinophilic airway inflammation and asthma, and for identifying steroid responsive individuals whose chronic respiratory symptoms may be caused by airway inflammation. In their 2011 clinical practice guideline, the ATS emphasizes the importance of using FENO.

## 2024-02-07 NOTE — ADDENDUM
[FreeTextEntry1] : Documented by Delio Nicolas acting as a scribe for Dr. Lucas Prado on 02/07/2024.   All medical record entries made by the Scribe were at my, Dr. Lucas Prado's, direction and personally dictated by me on 02/07/2024. I have reviewed the chart and agree that the record accurately reflects my personal performance of the history, physical exam, assessment and plan. I have also personally directed, reviewed, and agree with the discharge instructions.

## 2024-02-07 NOTE — PHYSICAL EXAM
[No Acute Distress] : no acute distress [Normal Oropharynx] : normal oropharynx [III] : Mallampati Class: III [Normal Appearance] : normal appearance [No Neck Mass] : no neck mass [Normal Rate/Rhythm] : normal rate/rhythm [Normal S1, S2] : normal s1, s2 [No Murmurs] : no murmurs [No Resp Distress] : no resp distress [Clear to Auscultation Bilaterally] : clear to auscultation bilaterally [Kyphosis] : kyphosis [Benign] : benign [Normal Gait] : normal gait [No Clubbing] : no clubbing [No Cyanosis] : no cyanosis [FROM] : FROM [Normal Color/ Pigmentation] : normal color/ pigmentation [No Focal Deficits] : no focal deficits [Oriented x3] : oriented x3 [Normal Affect] : normal affect [TextBox_68] : I:E ratio 1:3; inspiratory crackles 2/3 of the way up bilaterally  [TextBox_80] : kyphotic [TextBox_105] : 1+ LE edema

## 2024-03-26 ENCOUNTER — RX RENEWAL (OUTPATIENT)
Age: 79
End: 2024-03-26

## 2024-03-26 RX ORDER — UMECLIDINIUM BROMIDE AND VILANTEROL TRIFENATATE 62.5; 25 UG/1; UG/1
62.5-25 POWDER RESPIRATORY (INHALATION) DAILY
Qty: 3 | Refills: 1 | Status: ACTIVE | COMMUNITY
Start: 2023-01-17 | End: 1900-01-01

## 2024-04-04 ENCOUNTER — APPOINTMENT (OUTPATIENT)
Dept: GYNECOLOGIC ONCOLOGY | Facility: CLINIC | Age: 79
End: 2024-04-04
Payer: MEDICARE

## 2024-04-04 VITALS
WEIGHT: 166 LBS | HEIGHT: 61 IN | SYSTOLIC BLOOD PRESSURE: 105 MMHG | DIASTOLIC BLOOD PRESSURE: 71 MMHG | BODY MASS INDEX: 31.34 KG/M2 | HEART RATE: 76 BPM

## 2024-04-04 DIAGNOSIS — C54.3 MALIGNANT NEOPLASM OF FUNDUS UTERI: ICD-10-CM

## 2024-04-04 PROCEDURE — 99213 OFFICE O/P EST LOW 20 MIN: CPT

## 2024-04-04 NOTE — ASSESSMENT
[FreeTextEntry1] : 78 y/o with recurrent unstaged FIGO grade I endometrial cancer, s/p radiation therapy for vaginal recurrence, clinically without evidence of disease.

## 2024-04-04 NOTE — HISTORY OF PRESENT ILLNESS
[FreeTextEntry1] : Ms. Neely has recurrent endometrial cancer. She underwent ELIDA/BSO in 2004 for FIGO grade 1 endometrioid adenocarcinoma, myoinvasive <50% (4/11mm) with negative cytology and no staging performed. She did not have any adjuvant therapy and did not follow up consistently with a gyn oncologist until February 2013 when she was diagnosed with a small vaginal apex recurrence by Dr. Cristian Alvarez. PET/CT revealed no evidence of metastatic disease, and she was therefore treated with radiation therapy.  She was treated by Dr. Fry with EBRT 4500 cGy followed by brachytherapy completed 5/13/13. She tolerated treatment very well without significant toxicity.   She is overall feeling very well and denies cancer-associated signs and symptoms.  She had bilateral PEs of unknown etiology (per patient a PET was negative and hematology workup negative) and she is following with Dr. Prado and is on continuous O2 via NC now.  She is having regular BMs and denies urinary complaints other than incontinence which is chronic.  She has used her dilator intermittently. She denies any VB or abnormal discharge. She had some chronic BRBPR evaluated by her GI, it is attributed to constipation/hemorrhoids and has improved with use of stool softeners.  Incidentally, she also has a history of breast cancer which was diagnosed in 2008 and treated with left breast lumpectomy, radiation and chemotherapy. She was followed by Dr. Erickson Casanova at [facility], but currently has her breast exams with her medical oncologist. Dr. Boss. Recent  was elevated, so she has a PET/CT scheduled for 1/19/23 at [facility]. CHest CT by Dr. Prado 12/2022.  Of note, she has been oxygen dependent since 2022.  She is on Xarelto for bilateral DVT and PEs.  She had a PET/CT at [facility] 1/2023. We called for report; per patient it was ok.   6/2023- Tumor Markers- - 49.7, - 7.5, CEA- 3.1 12/2023- Tumor Markers- Ca15-3 - 51.1, - 7.4, CEA- 3.4  PAPs:  1/2019 vaginal biopsy benign 10/13/16 PAP negative 3/10/16: PAP negative; vaginal biopsy- granulation tissue 10/2015: she reports that Dr. Fry did a PAP and it was normal (we have no report) 2/2015 negative 10/22/14 insufficient due to scant cellularity;  1/15/14 negative; vaginal biopsy 5/2014 benign  Mammo/sono:  breast health per medical oncology Colonoscopy: 2014-15 negative per pt ; Dr. BRYSON Pires; diverticulosis; she had a virtual colonoscopy 2023  GI: Dr. BRYSON Pires PCP: Dr. Cintron Med onc: Dr. Eleuterio Gusman - continues in same office Endocrinologist (thyroid nodule): Dr. Funmi Boles Urologist: Dr. Brown Referring GYN: Dr. Cristian Alvarez

## 2024-04-04 NOTE — LETTER BODY
[I recently saw our patient [unfilled] for a follow-up visit.] : I recently saw our patient, [unfilled] for a follow-up visit. [Dear  ___] : Dear  [unfilled], [Attached please find my note.] : Attached please find my note. [FreeTextEntry2] : She is clinically without evidence of disease at this time. \par  I advised her to see me in 6 months for continued surveillance, and to see you annually in the interim. Thank you again for referring this sole patient.

## 2024-04-04 NOTE — DISCUSSION/SUMMARY
[FreeTextEntry1] : - she will continue dilator use.  - Risks, signs and symptoms of recurrent disease were reviewed.  - Medical oncology, GI followup and Urology followup recommended.  - She will return to see me in 6 months. She will contact the office with any questions or concerns.

## 2024-04-04 NOTE — PHYSICAL EXAM
[Chaperone Present] : A chaperone was present in the examining room during all aspects of the physical examination [Absent] : Adnexa(ae): Absent [Normal] : Recto-Vaginal Exam: Normal [de-identified] : well-healed vertical hysterectomy scar [de-identified] : mild stenosis at apex moderate atrophy; disrupted adhesions at apex bleed with speculum insertion but no lesions [de-identified] : no mass or tenderness

## 2024-06-12 ENCOUNTER — APPOINTMENT (OUTPATIENT)
Dept: PULMONOLOGY | Facility: CLINIC | Age: 79
End: 2024-06-12
Payer: MEDICARE

## 2024-06-12 VITALS
HEART RATE: 77 BPM | HEIGHT: 61 IN | SYSTOLIC BLOOD PRESSURE: 120 MMHG | TEMPERATURE: 97.2 F | DIASTOLIC BLOOD PRESSURE: 82 MMHG | WEIGHT: 166 LBS | BODY MASS INDEX: 31.34 KG/M2 | OXYGEN SATURATION: 95 % | RESPIRATION RATE: 16 BRPM

## 2024-06-12 DIAGNOSIS — J98.4 OTHER DISORDERS OF LUNG: ICD-10-CM

## 2024-06-12 DIAGNOSIS — K21.9 GASTRO-ESOPHAGEAL REFLUX DISEASE W/OUT ESOPHAGITIS: ICD-10-CM

## 2024-06-12 DIAGNOSIS — R93.89 ABNORMAL FINDINGS ON DIAGNOSTIC IMAGING OF OTHER SPECIFIED BODY STRUCTURES: ICD-10-CM

## 2024-06-12 DIAGNOSIS — R06.02 SHORTNESS OF BREATH: ICD-10-CM

## 2024-06-12 DIAGNOSIS — J96.11 CHRONIC RESPIRATORY FAILURE WITH HYPOXIA: ICD-10-CM

## 2024-06-12 DIAGNOSIS — Z86.711 PERSONAL HISTORY OF PULMONARY EMBOLISM: ICD-10-CM

## 2024-06-12 DIAGNOSIS — J84.9 INTERSTITIAL PULMONARY DISEASE, UNSPECIFIED: ICD-10-CM

## 2024-06-12 DIAGNOSIS — E66.3 OVERWEIGHT: ICD-10-CM

## 2024-06-12 PROCEDURE — 94729 DIFFUSING CAPACITY: CPT

## 2024-06-12 PROCEDURE — 94727 GAS DIL/WSHOT DETER LNG VOL: CPT

## 2024-06-12 PROCEDURE — 95012 NITRIC OXIDE EXP GAS DETER: CPT

## 2024-06-12 PROCEDURE — 94010 BREATHING CAPACITY TEST: CPT

## 2024-06-12 PROCEDURE — 99214 OFFICE O/P EST MOD 30 MIN: CPT | Mod: 25

## 2024-06-12 PROCEDURE — 94618 PULMONARY STRESS TESTING: CPT

## 2024-06-12 NOTE — ASSESSMENT
[FreeTextEntry1] : Ms. YEUNG is a 79 year old female with a history of left sided breast cancer, former 40+ pack smoker, s/p lumpectomy and radiation therapy (2008), uterine cancer, s/p ELIDA, diverticulitis, kyphoscoliosis, hidradenitis supertavia, thyroid nodules, 40 pack year smoker, bronchitis, vaginal cancer (2013) s/p radiation, s/p SVT, blood clots in both lungs (PE), arthritis, and balance issues presenting to the office today for a follow-up pulmonary evaluation. 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 Persaud ([facility]))  Problem 1: History of pulmonary emboli- NO PAH -get CTPA -get VQ Scan -complete Cardiac BNP q3 months -Complete yearly ECHO cardiogram ([place])- 7/2023, 1/2024 -continue Xarelto 15 mg Blood clots are noted w/in your lungs diagnosed by either abnormal CTPA or ventilation perfusion scan. You will need a hypercoagulable work up done by a hematologist to attempt to identify the etiology of this problem. Anticoagulation will be needed for at least 6 months- drugs included are Coumadin, lovenox, arixtra, or another agent. Repeat CTPA or VQ scan will be needed in approximately 6-8 weeks. An echocardiogram may be needed to assess for right ventricular function and pulmonary hypertension. The importance of hydration, ambulation, and regular/consistent diet are extremely important. -Pulmonary hypertension is a disorder of the pulmonary arteries. It is important to distinguish the difference between pulmonary arterial hypertension which is idiopathic versus secondary pulmonary hypertension which is related to heart disease being diastolic dysfunction or congestive heart failure or other forms of pulmonary hypertension. Diagnostics include an initial echocardiogram evaluating the pulmonary artery pressures. If this is abnormal, to proceed with a VQ scan as well as a CTPA, and an eventual right heart catheterization to absolutely confirm the echocardiogram findings. (No medication can be prescribed until the right heart catheterization). If present, the evaluation will include rheumatological blood testing, HIV testing, and potential evaluation for cirrhosis. Drug classes include: PED5 (Revatio, Adcirca); ETRA (Tracleer, Macitentan, Letairis); Soluble guanylate cyclase (Adempas); Prostacyclins (Uptravi, Tyavso, Ventavis, Remodulin, or Orenitram derivatives).  Problem 2: restrictive dysfunction (kyphoscoliosis) -s/p CBC -s/p iron studies - "positive" weight loss  Problem 2A: Anemia - (hgb 12.2) - f/p with hematologist  Problem 3: COPD -continue Anoro 1 inhalation QD COPD is a progressive disease and although it can't be cured , appropriate management can slow its progression, reduce frequency and severity of exacerbations, and improve symptoms and the patient quality of life. Hospitalizations are the greatest contributor to the total COPD costs and account for up to 87% of total COPD related costs. Exacerbations are the main cause of admissions and subsequently account for the 40-75% of COPD costs. Inhaled maintenance therapy reduces the incidence of exacerbations in patients with stable COPD. Incorrect inhaler use and nonadherence are major obstacles to achieving COPD treatment goals. Many COPD patients have challenges (impaired inhalation, limited dexterity, reduced cognition: that limit their ability to correctly use their COPD treatment devices resulting in reduced symptom control. Of most importance is smoking cessation and early intervention with respiratory illnesses and contemplation for pulmonary rehab to enhance quality of life. Inhaler technique reviewed as well as oral hygiene techniques reviewed with patient. Avoidance of cold air, extremes of temperature, rescue inhaler should be used before exercise. Order of medication reviewed with patient. Recommended use of a cool mist humidifier in the bedroom.   Problem 4: ILDz (stable) c/w RT changes (Stable) -s/p high resolution CT scan of the chest c/w mild ILDz, follow-up CT 10/2024 -complete rheumatologic blood work- NC -Hold Rx at reserve Etiology will depend on the causative agent possibilities include: idiopathic pulmonary fibrosis (UIP), NSIP (nonspecific interstitial pneumonia) , respiratory bronchiolitis in someone who is a smoker, drug induced lung disease, hypersensitivity pneumonitis, BOOP, sarcoidosis, chronic congestive heart failure, rheumatologic disorder induced interstitial lung disease. Optimal diagnosing will include rheumatological panel which would include ESR, ASHELY, ANCA, ARNP, CCP, rheumatoid factor, hypersensitivity panel, JOE1, scleroderma antibodies, sjogren's syndrome antibodies; biopsy will be necessary to definitively determine the etiology unless the CT scan findings are specific for UIP. Therapy will be based on diagnostics.   Problem 5: Allergies/PND -continue Olopatadine 0.6% 1 sniff BID Environmental measures for allergies were encouraged including mattress and pillow covers, air purifier, and environmental controls.  Problem 6: GERD -continue Pepcid 40mg QHS -Rule of 2s: avoid eating too much, eating too late, eating too spicy, eating two hours before bed. -Things to avoid including overeating, spicy foods, tight clothing, eating within three hours of bed, this list is not all inclusive. -For treatment of reflux, possible options discussed including diet control, H2 blockers, PPIs, as well as coating motility agents discussed as treatment options. Timing of meals and proximity of last meal to sleep were discussed. If symptoms persist, a formal gastrointestinal evaluation is needed.  Problem 7: Chronic Respiratory failure -Currently on Oxygen 2L NC -travel 4L NC -Patient is in a stable state -Tests results (overnight oximetry, 6 minute walk test, exercise study, arterial blood gas, etc.) reveal that oxygen is necessary for daily life- optimally it should be used with any activity and during sleep.  Problem 7A: Restrictive Dysfunction -Reassess exercise limitation caused by breathlessness and fatigue and also provide a supportive environment in which patients can become active and engage in management of their health problems -Recommended to get The Breather and Power Breath Medic Pro Plus IMT (30 repetitions BID for respiratory muscle strength weakness)  Problem 8: Balance issues -Complete balance therapy-rescripted  problem 9: cardiac disease? PAH -recommended to continue to follow up with Cardiologist (Dr. Persaud)  problem 10: poor breathing mechanics - Recommended "10-Day Detox" by Daniel Hodges for 2-3 weeks followed by probiotics -Recommended Bertram Carrillof and Butterri breathing techniques -Proper breathing techniques were reviewed with an emphasis of exhalation. Patient instructed to breath in for 1 second and out for four seconds. Patient was encouraged to not talk while walking.  problem 11: out of shape / overweight -Weight loss, exercise, and diet control were discussed and are highly encouraged. Treatment options were given such as, aqua therapy, and contacting a nutritionist. Recommended to use the elliptical, stationary bike, less use of treadmill. Mindful eating was explained to the patient Obesity is associated with worsening asthma, shortness of breath, and potential for cardiac disease, diabetes, and other underlying medical conditions  problem : health maintenance -recommended RSV vaccine in the Fall for anyone over the age of 60 -Antivax COVID-19 -Recommend [employer] Knee Sleeve -recommended yearly flu shot 2023 (refused)  -recommended strep pneumonia vaccines: Prevnar-13 vaccine, followed by Pneumo vaccine 23 one year following after 65 -recommended early intervention for Upper Respiratory Infections (URIs) -recommended regular osteoporosis evaluations -recommended early dermatological evaluations -recommended after the age of 50 to the age of 70, colonoscopy every 5 years  F/U in 3-4 months She is encouraged to call with any changes, concerns, or questions.

## 2024-06-12 NOTE — PROCEDURE
[FreeTextEntry1] : Full PFT reveals mild to moderate restrictive dysfunction FEV1 was 1.05 L which is 63% of predicted; moderately reduced lung volumes; moderately reduced diffusion at 8.9, which is 49% of predicted which corrects to 111% for volume; normal flow volume loop. PFTs were performed to evaluate for SOB  mild to mod res 1.05 63 mod mod 8.9 49 corrects to 111% for volume  6 minute walk test reveals a low saturation of 83% with severe dyspnea or fatigue; walked 146.7 meters on 4 LPM of supplemental O2  FENO was 24; a normal value being less than 25 Fractional exhaled nitric oxide (FENO) is regarded as a simple, noninvasive method for assessing eosinophilic airway inflammation. Produced by a variety of cells within the lung, nitric oxide (NO) concentrations are generally low in healthy individuals. However, high concentrations of NO appear to be involved in nonspecific host defense mechanisms and chronic inflammatory diseases such as asthma. The American Thoracic Society (ATS) therefore has strongly recommended using FENO to aid in the assessment, management, and long-term monitoring of eosinophilic airway inflammation and asthma, and for identifying steroid responsive individuals whose chronic respiratory symptoms may be caused by airway inflammation. In their 2011 clinical practice guideline, the ATS emphasizes the importance of using FENO.

## 2024-06-12 NOTE — HISTORY OF PRESENT ILLNESS
[TextBox_4] : Ms. YEUNG is a 79 year old female with a history of left sided breast cancer, s/p lumpectomy and radiation therapy (2008), uterine cancer, s/p ELIDA, diverticulitis, kyphoscoliosis, hidradenitis supertavia, thyroid nodules, 40 pack year smoker, bronchitis, vaginal cancer (2013) s/p radiation, blood clots in both lungs (PE), history of SVT, arthritis presenting to the office today for a follow-up pulmonary evaluation. Her chief complaint is  - she notes LEONG - she notes frequent night steps - she notes constipation - she notes if she bends down after drinking something, it will come back up  - she notes her weight is stable - she notes today, she coughed and brought up some blood in her mucus (bright red) - she notes her ankles are swollen which has been stable - she notes rhinitis in the mornings for about 30 minutes which then resolves on its own - she notes her energy levels could be better - she notes her balance is good - she notes not exercising much outside of walking in the stores she goes to   -she denies any headaches, nausea, emesis, fever, chills, sweats, chest pain, chest pressure, wheezing, palpitations, diarrhea, vertigo, dysphagia, heartburn, reflux, itchy eyes, itchy ears, leg pain, arthralgias, myalgias, hoarseness, or sour taste in the mouth.

## 2024-06-12 NOTE — PHYSICAL EXAM
[No Acute Distress] : no acute distress [Normal Oropharynx] : normal oropharynx [III] : Mallampati Class: III [Normal Appearance] : normal appearance [No Neck Mass] : no neck mass [Normal Rate/Rhythm] : normal rate/rhythm [Normal S1, S2] : normal s1, s2 [No Murmurs] : no murmurs [No Resp Distress] : no resp distress [Clear to Auscultation Bilaterally] : clear to auscultation bilaterally [Kyphosis] : kyphosis [Benign] : benign [Normal Gait] : normal gait [No Clubbing] : no clubbing [No Cyanosis] : no cyanosis [FROM] : FROM [Normal Color/ Pigmentation] : normal color/ pigmentation [No Focal Deficits] : no focal deficits [Oriented x3] : oriented x3 [Normal Affect] : normal affect [TextBox_80] : severe kyphoscoliosis on the R  [TextBox_68] : I:E ratio 1:3; inspiratory crackles 1/2 of the way up bilaterally  [TextBox_105] : 1+ LE edema

## 2024-06-12 NOTE — ADDENDUM
[FreeTextEntry1] : Documented by Delio Nicolas acting as a scribe for Dr. Lucas Prado on 06/12/2024.   All medical record entries made by the Scribe were at my, Dr. Lucas Prado's, direction and personally dictated by me on 06/12/2024. I have reviewed the chart and agree that the record accurately reflects my personal performance of the history, physical exam, assessment and plan. I have also personally directed, reviewed, and agree with the discharge instructions.

## 2024-06-12 NOTE — REASON FOR VISIT
[Follow-Up] : a follow-up visit [TextBox_44] : sob, restrictive dysfunction, COPD, pulmonary emboli, allergies, gerd, abnormal CT (RT changes on left), ILDz

## 2024-06-14 ENCOUNTER — APPOINTMENT (OUTPATIENT)
Dept: CT IMAGING | Facility: CLINIC | Age: 79
End: 2024-06-14
Payer: MEDICARE

## 2024-06-14 ENCOUNTER — OUTPATIENT (OUTPATIENT)
Dept: OUTPATIENT SERVICES | Facility: HOSPITAL | Age: 79
LOS: 1 days | End: 2024-06-14
Payer: MEDICARE

## 2024-06-14 DIAGNOSIS — Z90.710 ACQUIRED ABSENCE OF BOTH CERVIX AND UTERUS: Chronic | ICD-10-CM

## 2024-06-14 DIAGNOSIS — Z98.890 OTHER SPECIFIED POSTPROCEDURAL STATES: Chronic | ICD-10-CM

## 2024-06-14 DIAGNOSIS — Z86.711 PERSONAL HISTORY OF PULMONARY EMBOLISM: ICD-10-CM

## 2024-06-14 DIAGNOSIS — Z00.8 ENCOUNTER FOR OTHER GENERAL EXAMINATION: ICD-10-CM

## 2024-06-14 PROCEDURE — 71275 CT ANGIOGRAPHY CHEST: CPT

## 2024-06-14 PROCEDURE — 71275 CT ANGIOGRAPHY CHEST: CPT | Mod: 26

## 2024-06-24 ENCOUNTER — APPOINTMENT (OUTPATIENT)
Dept: RADIOLOGY | Facility: HOSPITAL | Age: 79
End: 2024-06-24
Payer: MEDICARE

## 2024-06-24 ENCOUNTER — OUTPATIENT (OUTPATIENT)
Dept: OUTPATIENT SERVICES | Facility: HOSPITAL | Age: 79
LOS: 1 days | End: 2024-06-24

## 2024-06-24 ENCOUNTER — OUTPATIENT (OUTPATIENT)
Dept: OUTPATIENT SERVICES | Facility: HOSPITAL | Age: 79
LOS: 1 days | End: 2024-06-24
Payer: MEDICARE

## 2024-06-24 ENCOUNTER — APPOINTMENT (OUTPATIENT)
Dept: NUCLEAR MEDICINE | Facility: HOSPITAL | Age: 79
End: 2024-06-24
Payer: MEDICARE

## 2024-06-24 DIAGNOSIS — Z90.710 ACQUIRED ABSENCE OF BOTH CERVIX AND UTERUS: Chronic | ICD-10-CM

## 2024-06-24 DIAGNOSIS — Z98.890 OTHER SPECIFIED POSTPROCEDURAL STATES: Chronic | ICD-10-CM

## 2024-06-24 DIAGNOSIS — Z86.711 PERSONAL HISTORY OF PULMONARY EMBOLISM: ICD-10-CM

## 2024-06-24 PROCEDURE — 78582 LUNG VENTILAT&PERFUS IMAGING: CPT | Mod: 26,GC

## 2024-06-24 PROCEDURE — 71046 X-RAY EXAM CHEST 2 VIEWS: CPT | Mod: 26

## 2024-08-20 ENCOUNTER — RX RENEWAL (OUTPATIENT)
Age: 79
End: 2024-08-20

## 2024-09-16 ENCOUNTER — RX RENEWAL (OUTPATIENT)
Age: 79
End: 2024-09-16

## 2024-10-03 ENCOUNTER — APPOINTMENT (OUTPATIENT)
Dept: GYNECOLOGIC ONCOLOGY | Facility: CLINIC | Age: 79
End: 2024-10-03
Payer: MEDICARE

## 2024-10-03 VITALS
WEIGHT: 165.2 LBS | DIASTOLIC BLOOD PRESSURE: 76 MMHG | OXYGEN SATURATION: 76 % | HEART RATE: 73 BPM | SYSTOLIC BLOOD PRESSURE: 132 MMHG | TEMPERATURE: 98.2 F | HEIGHT: 61 IN | BODY MASS INDEX: 31.19 KG/M2

## 2024-10-03 DIAGNOSIS — C54.3 MALIGNANT NEOPLASM OF FUNDUS UTERI: ICD-10-CM

## 2024-10-03 DIAGNOSIS — C50.919 MALIGNANT NEOPLASM OF UNSPECIFIED SITE OF UNSPECIFIED FEMALE BREAST: ICD-10-CM

## 2024-10-03 PROCEDURE — 99213 OFFICE O/P EST LOW 20 MIN: CPT

## 2024-10-03 PROCEDURE — 99459 PELVIC EXAMINATION: CPT

## 2024-10-03 NOTE — HISTORY OF PRESENT ILLNESS
[FreeTextEntry1] : Ms. Neely has recurrent endometrial cancer. She underwent ELIDA/BSO in 2004 for FIGO grade 1 endometrioid adenocarcinoma, myoinvasive <50% (4/11mm) with negative cytology and no staging performed. She did not have any adjuvant therapy and did not follow up consistently with a gyn oncologist until February 2013 when she was diagnosed with a small vaginal apex recurrence by Dr. Cristian Alvarez. PET/CT revealed no evidence of metastatic disease, and she was therefore treated with radiation therapy.  She was treated by Dr. Fry with EBRT 4500 cGy followed by brachytherapy completed 5/13/13. She tolerated treatment very well without significant toxicity.   She is overall feeling very well and denies cancer-associated signs and symptoms.  She had bilateral PEs of unknown etiology (per patient a PET was negative and hematology workup negative) and she is following with Dr. Prado and is on continuous O2 via NC now.  She is having regular BMs and denies urinary complaints other than incontinence which is chronic.  She has used her dilator intermittently. She denies any VB or abnormal discharge. She had some chronic BRBPR evaluated by her GI, it is attributed to constipation/hemorrhoids and has improved with use of stool softeners.  Incidentally, she also has a history of breast cancer which was diagnosed in 2008 and treated with left breast lumpectomy, radiation and chemotherapy. She was followed by Dr. Erickson Casanova at [facility], but currently has her breast exams with her medical oncologist. Dr. Boss. Recent  was elevated, so she has a PET/CT scheduled for 1/19/23 at [facility]. CHest CT by Dr. Prado 12/2022.  Of note, she has been oxygen dependent since 2022.  She is on Xarelto for bilateral DVT and PEs.  She had a PET/CT at [facility] 1/2023. We called for report; per patient it was ok.  Reports she had a another PET/CT this year and again it was ok per patient; we didnt receive any marker or scan results from tis year.   6/2023- Tumor Markers- - 49.7, - 7.5, CEA- 3.1 12/2023- Tumor Markers- Ca15-3 - 51.1, - 7.4, CEA- 3.4  PAPs:  1/2019 vaginal biopsy benign 10/13/16 PAP negative 3/10/16: PAP negative; vaginal biopsy- granulation tissue 10/2015: she reports that Dr. Fry did a PAP and it was normal (we have no report) 2/2015 negative 10/22/14 insufficient due to scant cellularity;  1/15/14 negative; vaginal biopsy 5/2014 benign  Mammo/sono:  breast health per medical oncology Colonoscopy: 2014-15 negative per pt ; Dr. BRYSON Pires; diverticulosis; she had a virtual colonoscopy 2023  GI: Dr. BRYSON Pires PCP: Dr. Cinrton Med onc: Dr. Eleuterio Gusman - continues in same office with Dr. Boss Endocrinologist (thyroid nodule): Dr. Funmi Boles Urologist: Dr. Brown Referring GYN: Dr. Cristian Alvarez

## 2024-10-03 NOTE — PHYSICAL EXAM
[Chaperone Present] : A chaperone was present in the examining room during all aspects of the physical examination [11857] : A chaperone was present during the pelvic exam. [FreeTextEntry2] : Marleen [Absent] : Adnexa(ae): Absent [Normal] : Recto-Vaginal Exam: Normal [de-identified] : well-healed vertical hysterectomy scar [de-identified] : mild stenosis at apex moderate atrophy; disrupted adhesions at apex bleed with speculum insertion but no lesions [de-identified] : no mass or tenderness

## 2024-10-03 NOTE — ASSESSMENT
[FreeTextEntry1] : 80 y/o with recurrent unstaged FIGO grade I endometrial cancer, s/p radiation therapy for vaginal recurrence, clinically without evidence of disease.

## 2024-10-03 NOTE — DISCUSSION/SUMMARY
[FreeTextEntry1] : - we called for 2024 scan/marker reports from [facility] - she will continue dilator use.  - Risks, signs and symptoms of recurrent disease were reviewed.  - Medical oncology, GI followup and Urology followup recommended.  - She will return to see me in 6-7 months. She will contact the office with any questions or concerns.

## 2024-11-08 ENCOUNTER — APPOINTMENT (OUTPATIENT)
Dept: PULMONOLOGY | Facility: CLINIC | Age: 79
End: 2024-11-08
Payer: MEDICARE

## 2024-11-08 VITALS
OXYGEN SATURATION: 95 % | TEMPERATURE: 97.3 F | HEART RATE: 64 BPM | HEIGHT: 61 IN | SYSTOLIC BLOOD PRESSURE: 120 MMHG | BODY MASS INDEX: 29.83 KG/M2 | RESPIRATION RATE: 18 BRPM | WEIGHT: 158 LBS | DIASTOLIC BLOOD PRESSURE: 58 MMHG

## 2024-11-08 DIAGNOSIS — R93.89 ABNORMAL FINDINGS ON DIAGNOSTIC IMAGING OF OTHER SPECIFIED BODY STRUCTURES: ICD-10-CM

## 2024-11-08 DIAGNOSIS — R26.89 OTHER ABNORMALITIES OF GAIT AND MOBILITY: ICD-10-CM

## 2024-11-08 DIAGNOSIS — K21.9 GASTRO-ESOPHAGEAL REFLUX DISEASE W/OUT ESOPHAGITIS: ICD-10-CM

## 2024-11-08 DIAGNOSIS — R06.02 SHORTNESS OF BREATH: ICD-10-CM

## 2024-11-08 DIAGNOSIS — R09.82 POSTNASAL DRIP: ICD-10-CM

## 2024-11-08 DIAGNOSIS — J96.11 CHRONIC RESPIRATORY FAILURE WITH HYPOXIA: ICD-10-CM

## 2024-11-08 DIAGNOSIS — J84.9 INTERSTITIAL PULMONARY DISEASE, UNSPECIFIED: ICD-10-CM

## 2024-11-08 PROCEDURE — 94729 DIFFUSING CAPACITY: CPT

## 2024-11-08 PROCEDURE — 94799 UNLISTED PULMONARY SVC/PX: CPT

## 2024-11-08 PROCEDURE — 94010 BREATHING CAPACITY TEST: CPT

## 2024-11-08 PROCEDURE — ZZZZZ: CPT

## 2024-11-08 PROCEDURE — 99214 OFFICE O/P EST MOD 30 MIN: CPT | Mod: 25

## 2024-11-08 PROCEDURE — 95012 NITRIC OXIDE EXP GAS DETER: CPT

## 2024-11-08 PROCEDURE — 94727 GAS DIL/WSHOT DETER LNG VOL: CPT

## 2025-02-11 ENCOUNTER — APPOINTMENT (OUTPATIENT)
Dept: OPHTHALMOLOGY | Facility: CLINIC | Age: 80
End: 2025-02-11

## 2025-02-27 ENCOUNTER — RX RENEWAL (OUTPATIENT)
Age: 80
End: 2025-02-27

## 2025-04-04 ENCOUNTER — NON-APPOINTMENT (OUTPATIENT)
Age: 80
End: 2025-04-04

## 2025-04-04 ENCOUNTER — APPOINTMENT (OUTPATIENT)
Dept: PULMONOLOGY | Facility: CLINIC | Age: 80
End: 2025-04-04
Payer: MEDICARE

## 2025-04-04 VITALS
WEIGHT: 156 LBS | DIASTOLIC BLOOD PRESSURE: 60 MMHG | HEART RATE: 75 BPM | TEMPERATURE: 97.7 F | OXYGEN SATURATION: 94 % | RESPIRATION RATE: 18 BRPM | SYSTOLIC BLOOD PRESSURE: 110 MMHG | BODY MASS INDEX: 29.45 KG/M2 | HEIGHT: 61 IN

## 2025-04-04 DIAGNOSIS — R26.89 OTHER ABNORMALITIES OF GAIT AND MOBILITY: ICD-10-CM

## 2025-04-04 DIAGNOSIS — R93.89 ABNORMAL FINDINGS ON DIAGNOSTIC IMAGING OF OTHER SPECIFIED BODY STRUCTURES: ICD-10-CM

## 2025-04-04 DIAGNOSIS — E66.3 OVERWEIGHT: ICD-10-CM

## 2025-04-04 DIAGNOSIS — R06.02 SHORTNESS OF BREATH: ICD-10-CM

## 2025-04-04 DIAGNOSIS — K21.9 GASTRO-ESOPHAGEAL REFLUX DISEASE W/OUT ESOPHAGITIS: ICD-10-CM

## 2025-04-04 DIAGNOSIS — J98.4 OTHER DISORDERS OF LUNG: ICD-10-CM

## 2025-04-04 DIAGNOSIS — J84.9 INTERSTITIAL PULMONARY DISEASE, UNSPECIFIED: ICD-10-CM

## 2025-04-04 PROCEDURE — 94010 BREATHING CAPACITY TEST: CPT

## 2025-04-04 PROCEDURE — 94799 UNLISTED PULMONARY SVC/PX: CPT

## 2025-04-04 PROCEDURE — ZZZZZ: CPT

## 2025-04-04 PROCEDURE — 94729 DIFFUSING CAPACITY: CPT

## 2025-04-04 PROCEDURE — 94727 GAS DIL/WSHOT DETER LNG VOL: CPT

## 2025-04-04 PROCEDURE — 95012 NITRIC OXIDE EXP GAS DETER: CPT

## 2025-04-04 PROCEDURE — 99214 OFFICE O/P EST MOD 30 MIN: CPT | Mod: 25

## 2025-04-22 ENCOUNTER — RX ONLY (RX ONLY)
Age: 80
End: 2025-04-22

## 2025-04-22 ENCOUNTER — OFFICE (OUTPATIENT)
Dept: URBAN - METROPOLITAN AREA CLINIC 104 | Facility: CLINIC | Age: 80
Setting detail: OPHTHALMOLOGY
End: 2025-04-22
Payer: MEDICARE

## 2025-04-22 DIAGNOSIS — H35.363: ICD-10-CM

## 2025-04-22 DIAGNOSIS — H25.13: ICD-10-CM

## 2025-04-22 DIAGNOSIS — H40.1133: ICD-10-CM

## 2025-04-22 DIAGNOSIS — H25.11: ICD-10-CM

## 2025-04-22 PROBLEM — H25.12 CATARACT SENILE NUCLEAR SCLEROSIS; RIGHT EYE, LEFT EYE, BOTH EYES: Status: ACTIVE | Noted: 2025-04-22

## 2025-04-22 PROCEDURE — 76514 ECHO EXAM OF EYE THICKNESS: CPT | Performed by: SPECIALIST

## 2025-04-22 PROCEDURE — 92136 OPHTHALMIC BIOMETRY: CPT | Mod: RT | Performed by: SPECIALIST

## 2025-04-22 PROCEDURE — 92136 OPHTHALMIC BIOMETRY: CPT | Mod: TC | Performed by: SPECIALIST

## 2025-04-22 PROCEDURE — 92134 CPTRZ OPH DX IMG PST SGM RTA: CPT | Performed by: SPECIALIST

## 2025-04-22 PROCEDURE — 92004 COMPRE OPH EXAM NEW PT 1/>: CPT | Performed by: SPECIALIST

## 2025-04-22 ASSESSMENT — KERATOMETRY
OD_K1POWER_DIOPTERS: 41.72
OS_K2POWER_DIOPTERS: 42.67
OD_CYLPOWER_DEGREES: 1.11
OS_K1K2_AVERAGE: 42.51
OD_AXISANGLE_DEGREES: 002
OS_K1POWER_DIOPTERS: 42.35
OS_AXISANGLE2_DEGREES: 180
OD_AXISANGLE_DEGREES: 002
OS_K2POWER_DIOPTERS: 42.67
OS_CYLAXISANGLE_DEGREES: 180
OD_CYLAXISANGLE_DEGREES: 2
OS_AXISANGLE_DEGREES: 180
OD_K1POWER_DIOPTERS: 41.72
OD_K2POWER_DIOPTERS: 42.83
OD_K2POWER_DIOPTERS: 42.83
OD_K1K2_AVERAGE: 42.27
OS_AXISANGLE_DEGREES: 180
OD_AXISANGLE2_DEGREES: 002
OS_CYLPOWER_DEGREES: 0.32
OS_K1POWER_DIOPTERS: 42.35

## 2025-04-22 ASSESSMENT — TONOMETRY
OS_IOP_MMHG: 11
OD_IOP_MMHG: 11

## 2025-04-22 ASSESSMENT — VISUAL ACUITY
OS_BCVA: 20/40
OD_BCVA: 20/40

## 2025-04-22 ASSESSMENT — REFRACTION_MANIFEST
OD_VA1: 20/40
OD_AXIS: 089
OS_AXIS: 073
OD_CYLINDER: -3.00
OD_SPHERE: +3.50
OS_CYLINDER: -2.00
OS_VA1: 20/40
OS_SPHERE: +4.00

## 2025-04-22 ASSESSMENT — REFRACTION_AUTOREFRACTION
OS_CYLINDER: -2.00
OS_SPHERE: +4.00
OD_CYLINDER: -3.00
OD_SPHERE: +3.50
OD_AXIS: 089
OS_AXIS: 073

## 2025-04-22 ASSESSMENT — REFRACTION_CURRENTRX
OS_VPRISM_DIRECTION: PROGS
OS_CYLINDER: -1.50
OD_ADD: +2.50
OS_SPHERE: +4.00
OD_AXIS: 103
OS_OVR_VA: 20/
OD_CYLINDER: -1.50
OD_OVR_VA: 20/
OS_ADD: +2.50
OD_SPHERE: +4.00
OS_AXIS: 097
OD_VPRISM_DIRECTION: PROGS

## 2025-04-22 ASSESSMENT — PACHYMETRY
OS_CT_UM: 529
OD_CT_CORRECTION: 1
OS_CT_CORRECTION: 1
OD_CT_UM: 534

## 2025-04-22 ASSESSMENT — CONFRONTATIONAL VISUAL FIELD TEST (CVF)
OS_FINDINGS: FULL
OD_FINDINGS: FULL

## 2025-05-13 ENCOUNTER — RX RENEWAL (OUTPATIENT)
Age: 80
End: 2025-05-13

## 2025-06-02 ENCOUNTER — APPOINTMENT (OUTPATIENT)
Dept: CT IMAGING | Facility: IMAGING CENTER | Age: 80
End: 2025-06-02
Payer: MEDICARE

## 2025-06-02 ENCOUNTER — OUTPATIENT (OUTPATIENT)
Dept: OUTPATIENT SERVICES | Facility: HOSPITAL | Age: 80
LOS: 1 days | End: 2025-06-02
Payer: MEDICARE

## 2025-06-02 DIAGNOSIS — Z90.710 ACQUIRED ABSENCE OF BOTH CERVIX AND UTERUS: Chronic | ICD-10-CM

## 2025-06-02 DIAGNOSIS — Z98.890 OTHER SPECIFIED POSTPROCEDURAL STATES: Chronic | ICD-10-CM

## 2025-06-02 DIAGNOSIS — J84.9 INTERSTITIAL PULMONARY DISEASE, UNSPECIFIED: ICD-10-CM

## 2025-06-02 PROCEDURE — 71250 CT THORAX DX C-: CPT | Mod: 26

## 2025-06-02 PROCEDURE — 71250 CT THORAX DX C-: CPT

## 2025-06-05 ENCOUNTER — APPOINTMENT (OUTPATIENT)
Dept: GYNECOLOGIC ONCOLOGY | Facility: CLINIC | Age: 80
End: 2025-06-05
Payer: MEDICARE

## 2025-06-05 VITALS
DIASTOLIC BLOOD PRESSURE: 75 MMHG | WEIGHT: 160 LBS | OXYGEN SATURATION: 96 % | TEMPERATURE: 98 F | SYSTOLIC BLOOD PRESSURE: 141 MMHG | HEIGHT: 61 IN | HEART RATE: 69 BPM | BODY MASS INDEX: 30.21 KG/M2 | RESPIRATION RATE: 18 BRPM

## 2025-06-05 DIAGNOSIS — C54.3 MALIGNANT NEOPLASM OF FUNDUS UTERI: ICD-10-CM

## 2025-06-05 PROCEDURE — 99213 OFFICE O/P EST LOW 20 MIN: CPT

## 2025-06-05 PROCEDURE — 99459 PELVIC EXAMINATION: CPT

## 2025-09-03 ENCOUNTER — APPOINTMENT (OUTPATIENT)
Dept: PULMONOLOGY | Facility: CLINIC | Age: 80
End: 2025-09-03